# Patient Record
Sex: FEMALE | Race: WHITE | NOT HISPANIC OR LATINO | URBAN - METROPOLITAN AREA
[De-identification: names, ages, dates, MRNs, and addresses within clinical notes are randomized per-mention and may not be internally consistent; named-entity substitution may affect disease eponyms.]

---

## 2017-08-01 ENCOUNTER — TRANSCRIBE ORDERS (OUTPATIENT)
Dept: PHYSICAL THERAPY | Facility: HOSPITAL | Age: 42
End: 2017-08-01

## 2017-08-01 DIAGNOSIS — I89.0 LYMPHEDEMA: Primary | ICD-10-CM

## 2017-08-15 ENCOUNTER — HOSPITAL ENCOUNTER (OUTPATIENT)
Dept: OCCUPATIONAL THERAPY | Facility: HOSPITAL | Age: 42
Setting detail: THERAPIES SERIES
Discharge: HOME | End: 2017-08-15

## 2017-08-15 DIAGNOSIS — I89.0 LYMPHEDEMA OF BOTH LOWER EXTREMITIES: Primary | ICD-10-CM

## 2017-08-15 PROCEDURE — 97165 OT EVAL LOW COMPLEX 30 MIN: CPT

## 2017-08-15 NOTE — THERAPY EVALUATION
Outpatient Occupational Therapy Lymphedema Initial Evaluation  [facility]     Patient Name: Shelley Vo  : 1975  MRN: [id number]  Today's Date: 8/15/2017      Visit Date: 08/15/2017    There is no problem list on file for this patient.       No past medical history on file.     Past Surgical History:   Procedure Laterality Date   • ABDOMINAL SURGERY      due to ulcers   • GALLBLADDER SURGERY     • GASTRIC BYPASS           Visit Dx:     ICD-10-CM ICD-9-CM   1. Lymphedema of both lower extremities I89.0 457.1             Patient History       08/15/17 1600          History    Chief Complaint Other 1 (comment);Pain   bilateral LE edema  -RE      Date Current Problem(s) Began 08/15/17  -RE      Brief Description of Current Complaint Patient presents with bilateral LE edema and pain.  She reports it started about 10 years ago and has been ongoing.  She wore compression stockings during her last pregnancy but they did not work well for her.    -RE      Previous treatment for THIS PROBLEM Other (comment)   compression stockings  -RE      Are you or can you be pregnant No  -RE      Pain     Pain Location Leg  -RE      Pain at Present 5  -RE      Pain at Best 3  -RE      Pain at Worst 6  -RE      Pain Frequency Constant/continuous  -RE      Fall Risk Assessment    Any falls in the past year: Yes  -RE      Number of falls reported in the last 12 months 1  -RE      Factors that contributed to the fall: Other (comment)   faint due to vaso-vagal  -RE      Does patient have a fear of falling No  -RE      Services    Are you currently receiving Home Health services No  -RE      Daily Activities    Primary Language English  -RE      Are you able to read Yes  -RE      Are you able to write Yes  -RE      How does patient learn best? Reading  -RE      Teaching needs identified Home Exercise Program;Management of Condition  -RE      Patient is concerned about/has problems with Other (comment)   clothes fitting  -RE      Does  patient have problems with the following? Depression  -RE      Barriers to learning None  -RE      Functional Status dressing;mobility issues preventing performance of daily activities  -RE      Pt Participated in POC and Goals Yes  -RE      Safety    Are you being hurt, hit, or frightened by anyone at home or in your life? No  -RE      Are you being neglected by a caregiver No  -RE        User Key  (r) = Recorded By, (t) = Taken By, (c) = Cosigned By    Initials Name Provider Type    RE BARBARA Hernandez Occupational Therapist                Lymphedema       08/15/17 1600          Lymphedema Assessment    Lymphedema Classification RLE:;LLE:;secondary;stage 2 (Spontaneously Irreversible)  -RE      Chemo Received no  -RE      Radiation Therapy Received no  -RE      Infections or Cellulitis? no  -RE      Subjective Pain    Able to rate subjective pain? yes  -RE      Pre-Treatment Pain Level 6  -RE      Post-Treatment Pain Level 6  -RE      Lymphedema Edema Assessment    Ptting Edema Category By grade out of 4  -RE      Pitting Edema + 1/4 (+1 of 4);Moderate   edema is spongey and non pitting  -RE      Stemmer Sign bilateral:;negative  -RE      Lemoore Hump negative  -RE      Skin Changes/Observations    Location/Assessment Lower Extremity  -RE      Lower Extremity Conditions bilateral:;intact;clean;dry  -RE      Lower Extremity Color/Pigment bilateral:   normal color  -RE      Lymphedema Sensation    Lymphedema Sensation Reports RUE:;LUE:   normal  -RE      Lymphedema Pulses/Capillary Refill    Lymphedema Pulses/Capillary Refill capillary refill  -RE      Capillary Refill lower extremity capillary refill  -RE      Lower Extremity Capillary Refill right:;left:;less than 3 seconds  -RE      Lymphedema Measurements    Measurement Type(s) Circumferential  -RE      Circumferential Areas Lower extremities  -RE      LLE Circumferential (cm)    Measurement Location 1 Knee (popliteal crease)  -RE      Left 1 42 cm  -RE       Measurement Location 2 10 cm below knee  -RE      Left 2 50 cm  -RE      Measurement Location 3 20 cm below knee  -RE      Left 3 47 cm  -RE      Measurement Location 4 30 cm below knee  -RE      Left 4 36 cm  -RE      Measurement Location 5 Ankle  -RE      Left 5 25 cm  -RE      Measurement Location 6 Mid foot  -RE      Left 6 20.5 cm  -RE      Measurement Location 7 Total  -RE      Left 7 220.5 cm  -RE      RLE Circumferential (cm)    Right 1 42 cm  -RE      Right 2 51 cm  -RE      Right 3 48 cm  -RE      Right 4 35 cm  -RE      Right 5 25.5 cm  -RE      Right 6 20.4 cm  -RE      Right 7 221.9 cm  -RE        User Key  (r) = Recorded By, (t) = Taken By, (c) = Cosigned By    Initials Name Provider Type    BARBARA Leung Occupational Therapist                OT Ortho       08/15/17 1600          ROM (Range of Motion)    General ROM no range of motion deficits identified  -RE      MMT (Manual Muscle Testing)    General MMT Assessment Detail strength is >/= 4/5  -RE        User Key  (r) = Recorded By, (t) = Taken By, (c) = Cosigned By    Initials Name Provider Type    BARBARA Leung Occupational Therapist                    Therapy Education       08/15/17 1638          Therapy Education    Given Symptoms/condition management;Edema management  -RE      Program New  -RE      How Provided Verbal;Written  -RE      Provided to Patient  -RE      Level of Understanding Teach back education performed;Verbalized  -RE        User Key  (r) = Recorded By, (t) = Taken By, (c) = Cosigned By    Initials Name Provider Type    BARBARA Leung Occupational Therapist                        OT Goals       08/15/17 1600       OT Short Term Goals    STG Date to Achieve 08/29/17  -RE     STG 1 Patient independent and compliant with self-wrapping techniques of compression bandages with assistance of caregiver as needed for improved self-management of condition.  -RE     STG 1 Progress New  -RE     STG 2 Patient will  demonstrate proper awareness of condition and precautions for improved prevention, management, care of symptoms.  -RE     STG 2 Progress New  -RE     STG 3 Patient will demonstrate decreased net edema of bilateral lower extremities >/= 3-7cm  for decrease in edema, symptoms, decreased risk of infection and improved skin care/transition to self-care of condition.   -RE     STG 3 Progress New  -RE     Long Term Goals    LTG Date to Achieve 09/15/17  -RE     LTG 1 Patient will demonstrate decreased net edema of bilateral lower extremities >/= 8-15cm  for decrease in edema, symptoms, decreased risk of infection and improved skin care/transition to self-care of condition.   -RE     LTG 1 Progress New  -RE     LTG 2 Patient independent and compliant with home exercise program (as able) focused on range of motion, flexibility to improve lymphatic flow and LE discomfort.  -RE     LTG 2 Progress New  -RE     LTG 3 Patient independent and compliant with use and care of compression garments with assistance of a caregiver as needed to promote self-care independence.   -RE     LTG 3 Progress New  -RE     Time Calculation    OT Goal Re-Cert Due Date 09/15/17  -RE       User Key  (r) = Recorded By, (t) = Taken By, (c) = Cosigned By    Initials Name Provider Type    RE Ximena Barron OTR Occupational Therapist                OT Assessment/Plan       08/15/17 1617       OT Assessment    Functional Limitations Performance in self-care ADL;Performance in leisure activities;Limitation in home management  -RE     Impairments Edema;Pain  -RE     Assessment Comments Patient presents with bilateral LE lymphedema which is soft and non pitting. She reports that her legs are firm at times.  She could benfit from Complete Decongestive Therapy too decrease edema, prevent infection, protect skin integrity and to learn self care strategies.  -RE     Please refer to paper survey for additional self-reported information Yes  -RE     OT Diagnosis  bilateral LE lymphedema  -RE     OT Rehab Potential Good  -RE     Patient/caregiver participated in establishment of treatment plan and goals Yes  -RE     Patient would benefit from skilled therapy intervention Yes  -RE     OT Plan    OT Frequency 4x/week;3x/week  -RE     Predicted Duration of Therapy Intervention (days/wks) 4 weeks  -RE     Planned CPT's? OT EVAL LOW COMPLEXITY: 27814;OT SELF CARE/MGMT/TRAIN 15 MIN: 42561;OT THER PROC EA 15 MIN: [id number];OT THER ACT EA 15 MIN: [id number];OT MANUAL THERAPY EA 15 MIN: 91331  -RE     Planned Therapy Interventions (Optional Details) manual therapy techniques;home exercise program;patient/family education   Skin care and compression bandaging  -RE     OT Plan Comments Submit paperwork to [facility] to determine coverage for bandages and garments.  -RE       User Key  (r) = Recorded By, (t) = Taken By, (c) = Cosigned By    Initials Name Provider Type    RE BARBARA Hernandez Occupational Therapist                Time Calculation:   OT Start Time: 1430  OT Stop Time: 1530  OT Time Calculation (min): 60 min     Therapy Charges for Today     Code Description Service Date Service Provider Modifiers Qty    [id number]  OT EVAL LOW COMPLEXITY 4 8/15/2017 BARBARA Hernandez GO 1                    BARBARA Hernandez  8/15/2017

## 2017-12-19 ENCOUNTER — DOCUMENTATION (OUTPATIENT)
Dept: OCCUPATIONAL THERAPY | Facility: HOSPITAL | Age: 42
End: 2017-12-19

## 2017-12-19 NOTE — THERAPY DISCHARGE NOTE
Outpatient Occupational Therapy Lymphedema Treatment Note/Discharge Summary       Patient Name: Shelley Vo  : 1975  MRN: [id number]  Today's Date: 2017      Visit Date: 2017    There is no problem list on file for this patient.       No past medical history on file.     Past Surgical History:   Procedure Laterality Date   • ABDOMINAL SURGERY      due to ulcers   • GALLBLADDER SURGERY     • GASTRIC BYPASS           Visit Dx:    No diagnosis found.                                      OT Goals       17 1600       OT Short Term Goals    STG 1 Patient independent and compliant with self-wrapping techniques of compression bandages with assistance of caregiver as needed for improved self-management of condition.  -RE     STG 1 Progress Not Met  -RE     STG 2 Patient will demonstrate proper awareness of condition and precautions for improved prevention, management, care of symptoms.  -RE     STG 2 Progress Not Met  -RE     STG 3 Patient will demonstrate decreased net edema of bilateral lower extremities >/= 3-7cm  for decrease in edema, symptoms, decreased risk of infection and improved skin care/transition to self-care of condition.   -RE     STG 3 Progress Not Met  -RE     Long Term Goals    LTG 1 Patient will demonstrate decreased net edema of bilateral lower extremities >/= 8-15cm  for decrease in edema, symptoms, decreased risk of infection and improved skin care/transition to self-care of condition.   -RE     LTG 1 Progress Not Met  -RE     LTG 2 Patient independent and compliant with home exercise program (as able) focused on range of motion, flexibility to improve lymphatic flow and LE discomfort.  -RE     LTG 2 Progress Not Met  -RE     LTG 3 Patient independent and compliant with use and care of compression garments with assistance of a caregiver as needed to promote self-care independence.   -RE     LTG 3 Progress Not Met  -RE       User Key  (r) = Recorded By, (t) = Taken By, (c) =  Cosigned By    Initials Name Provider Type    RE Ximena Barron OTR Occupational Therapist                           Time Calculation:                       OP OT Discharge Summary  Date of Discharge: 12/19/17  Reason for Discharge: Unable to participate (Patient was unable to afford bandages.)  Outcomes Achieved: Unable to make functional progress toward goals at this time  Discharge Destination: Home without follow-up      BARBARA Hernandez  12/19/2017

## 2018-04-18 ENCOUNTER — OFFICE (AMBULATORY)
Dept: URBAN - METROPOLITAN AREA CLINIC 75 | Facility: CLINIC | Age: 43
End: 2018-04-18
Payer: COMMERCIAL

## 2018-04-18 VITALS
HEART RATE: 74 BPM | SYSTOLIC BLOOD PRESSURE: 114 MMHG | HEIGHT: 67 IN | DIASTOLIC BLOOD PRESSURE: 72 MMHG | WEIGHT: 202 LBS

## 2018-04-18 DIAGNOSIS — R10.32 LEFT LOWER QUADRANT PAIN: ICD-10-CM

## 2018-04-18 DIAGNOSIS — Z98.84 BARIATRIC SURGERY STATUS: ICD-10-CM

## 2018-04-18 DIAGNOSIS — R11.2 NAUSEA WITH VOMITING, UNSPECIFIED: ICD-10-CM

## 2018-04-18 DIAGNOSIS — K21.9 GASTRO-ESOPHAGEAL REFLUX DISEASE WITHOUT ESOPHAGITIS: ICD-10-CM

## 2018-04-18 DIAGNOSIS — D50.9 IRON DEFICIENCY ANEMIA, UNSPECIFIED: ICD-10-CM

## 2018-04-18 DIAGNOSIS — R10.31 RIGHT LOWER QUADRANT PAIN: ICD-10-CM

## 2018-04-18 DIAGNOSIS — K25.9 GASTRIC ULCER, UNSPECIFIED AS ACUTE OR CHRONIC, WITHOUT HEMO: ICD-10-CM

## 2018-04-18 DIAGNOSIS — R13.10 DYSPHAGIA, UNSPECIFIED: ICD-10-CM

## 2018-04-18 PROCEDURE — 99214 OFFICE O/P EST MOD 30 MIN: CPT

## 2018-04-18 RX ORDER — ONDANSETRON HYDROCHLORIDE 4 MG/1
16 TABLET, ORALLY DISINTEGRATING ORAL
Qty: 60 | Refills: 3 | Status: ACTIVE
Start: 2018-04-18

## 2018-04-18 RX ORDER — FAMOTIDINE 40 MG/1
TABLET, FILM COATED ORAL
Qty: 30 | Refills: 10 | Status: ACTIVE
Start: 2018-04-18

## 2018-05-24 VITALS
SYSTOLIC BLOOD PRESSURE: 79 MMHG | SYSTOLIC BLOOD PRESSURE: 82 MMHG | DIASTOLIC BLOOD PRESSURE: 54 MMHG | DIASTOLIC BLOOD PRESSURE: 71 MMHG | SYSTOLIC BLOOD PRESSURE: 108 MMHG | RESPIRATION RATE: 18 BRPM | HEART RATE: 62 BPM | HEIGHT: 67 IN | DIASTOLIC BLOOD PRESSURE: 44 MMHG | DIASTOLIC BLOOD PRESSURE: 39 MMHG | OXYGEN SATURATION: 98 % | HEART RATE: 72 BPM | OXYGEN SATURATION: 100 % | SYSTOLIC BLOOD PRESSURE: 137 MMHG | SYSTOLIC BLOOD PRESSURE: 102 MMHG | DIASTOLIC BLOOD PRESSURE: 80 MMHG | OXYGEN SATURATION: 97 % | OXYGEN SATURATION: 95 % | HEART RATE: 64 BPM | HEART RATE: 61 BPM | SYSTOLIC BLOOD PRESSURE: 93 MMHG | SYSTOLIC BLOOD PRESSURE: 88 MMHG | DIASTOLIC BLOOD PRESSURE: 78 MMHG | RESPIRATION RATE: 15 BRPM | DIASTOLIC BLOOD PRESSURE: 58 MMHG | HEART RATE: 66 BPM | HEART RATE: 71 BPM | DIASTOLIC BLOOD PRESSURE: 50 MMHG | HEART RATE: 60 BPM | RESPIRATION RATE: 16 BRPM | SYSTOLIC BLOOD PRESSURE: 125 MMHG | SYSTOLIC BLOOD PRESSURE: 126 MMHG | TEMPERATURE: 96.8 F | SYSTOLIC BLOOD PRESSURE: 85 MMHG | DIASTOLIC BLOOD PRESSURE: 46 MMHG | WEIGHT: 198 LBS | DIASTOLIC BLOOD PRESSURE: 45 MMHG | HEART RATE: 70 BPM | RESPIRATION RATE: 17 BRPM | RESPIRATION RATE: 20 BRPM | DIASTOLIC BLOOD PRESSURE: 53 MMHG | TEMPERATURE: 97.7 F

## 2018-05-29 ENCOUNTER — AMBULATORY SURGICAL CENTER (AMBULATORY)
Dept: URBAN - METROPOLITAN AREA SURGERY 17 | Facility: SURGERY | Age: 43
End: 2018-05-29
Payer: COMMERCIAL

## 2018-05-29 ENCOUNTER — OFFICE (AMBULATORY)
Dept: URBAN - METROPOLITAN AREA PATHOLOGY 4 | Facility: PATHOLOGY | Age: 43
End: 2018-05-29
Payer: COMMERCIAL

## 2018-05-29 DIAGNOSIS — R13.10 DYSPHAGIA, UNSPECIFIED: ICD-10-CM

## 2018-05-29 DIAGNOSIS — K21.9 GASTRO-ESOPHAGEAL REFLUX DISEASE WITHOUT ESOPHAGITIS: ICD-10-CM

## 2018-05-29 DIAGNOSIS — R10.11 RIGHT UPPER QUADRANT PAIN: ICD-10-CM

## 2018-05-29 DIAGNOSIS — D50.9 IRON DEFICIENCY ANEMIA, UNSPECIFIED: ICD-10-CM

## 2018-05-29 DIAGNOSIS — K64.1 SECOND DEGREE HEMORRHOIDS: ICD-10-CM

## 2018-05-29 DIAGNOSIS — Z48.815 ENCOUNTER FOR SURGICAL AFTERCARE FOLLOWING SURGERY ON THE DI: ICD-10-CM

## 2018-05-29 DIAGNOSIS — K57.30 DIVERTICULOSIS OF LARGE INTESTINE WITHOUT PERFORATION OR ABS: ICD-10-CM

## 2018-05-29 DIAGNOSIS — K22.2 ESOPHAGEAL OBSTRUCTION: ICD-10-CM

## 2018-05-29 LAB
GI HISTOLOGY: A. UNSPECIFIED: (no result)
GI HISTOLOGY: PDF REPORT: (no result)

## 2018-05-29 PROCEDURE — 43249 ESOPH EGD DILATION <30 MM: CPT | Performed by: INTERNAL MEDICINE

## 2018-05-29 PROCEDURE — 45378 DIAGNOSTIC COLONOSCOPY: CPT | Performed by: INTERNAL MEDICINE

## 2018-05-29 PROCEDURE — 88305 TISSUE EXAM BY PATHOLOGIST: CPT | Performed by: INTERNAL MEDICINE

## 2018-05-29 RX ADMIN — PROPOFOL 50 MG: 10 INJECTION, EMULSION INTRAVENOUS at 13:15

## 2018-05-29 RX ADMIN — PROPOFOL 25 MG: 10 INJECTION, EMULSION INTRAVENOUS at 13:28

## 2018-05-29 RX ADMIN — PROPOFOL 50 MG: 10 INJECTION, EMULSION INTRAVENOUS at 13:23

## 2018-05-29 RX ADMIN — PROPOFOL 50 MG: 10 INJECTION, EMULSION INTRAVENOUS at 13:17

## 2018-05-29 RX ADMIN — LIDOCAINE HYDROCHLORIDE 25 MG: 10 INJECTION, SOLUTION EPIDURAL; INFILTRATION; INTRACAUDAL; PERINEURAL at 13:08

## 2018-05-29 RX ADMIN — PROPOFOL 100 MG: 10 INJECTION, EMULSION INTRAVENOUS at 13:08

## 2018-05-29 NOTE — SERVICENOTES
Patient understands associated risk, benefit of endoscopy including bleeding, infection, missed polyp or missed cancer, perforation, missed lesion, death. Withdrawal time was > / = 6 minutes.
Patient may want to consider PPI in place of H2B given EGD findings.

## 2018-06-18 ENCOUNTER — OFFICE (AMBULATORY)
Dept: URBAN - METROPOLITAN AREA CLINIC 75 | Facility: CLINIC | Age: 43
End: 2018-06-18
Payer: COMMERCIAL

## 2018-06-18 VITALS
WEIGHT: 199 LBS | HEIGHT: 67 IN | HEART RATE: 88 BPM | DIASTOLIC BLOOD PRESSURE: 80 MMHG | SYSTOLIC BLOOD PRESSURE: 140 MMHG

## 2018-06-18 DIAGNOSIS — K21.9 GASTRO-ESOPHAGEAL REFLUX DISEASE WITHOUT ESOPHAGITIS: ICD-10-CM

## 2018-06-18 DIAGNOSIS — Z98.84 BARIATRIC SURGERY STATUS: ICD-10-CM

## 2018-06-18 DIAGNOSIS — R13.10 DYSPHAGIA, UNSPECIFIED: ICD-10-CM

## 2018-06-18 PROCEDURE — 99214 OFFICE O/P EST MOD 30 MIN: CPT

## 2018-06-18 RX ORDER — OMEPRAZOLE MAGNESIUM 40 MG/1
CAPSULE, DELAYED RELEASE ORAL
Qty: 180 | Refills: 3 | Status: ACTIVE
Start: 2018-06-18

## 2018-06-21 VITALS
DIASTOLIC BLOOD PRESSURE: 57 MMHG | HEART RATE: 60 BPM | SYSTOLIC BLOOD PRESSURE: 106 MMHG | DIASTOLIC BLOOD PRESSURE: 58 MMHG | HEART RATE: 65 BPM | HEART RATE: 61 BPM | WEIGHT: 197 LBS | RESPIRATION RATE: 18 BRPM | SYSTOLIC BLOOD PRESSURE: 103 MMHG | SYSTOLIC BLOOD PRESSURE: 135 MMHG | SYSTOLIC BLOOD PRESSURE: 111 MMHG | OXYGEN SATURATION: 96 % | DIASTOLIC BLOOD PRESSURE: 100 MMHG | HEART RATE: 68 BPM | OXYGEN SATURATION: 100 % | TEMPERATURE: 96.6 F | RESPIRATION RATE: 20 BRPM | HEIGHT: 67 IN | RESPIRATION RATE: 16 BRPM | DIASTOLIC BLOOD PRESSURE: 60 MMHG | RESPIRATION RATE: 21 BRPM | TEMPERATURE: 97.2 F | SYSTOLIC BLOOD PRESSURE: 121 MMHG | DIASTOLIC BLOOD PRESSURE: 70 MMHG | HEART RATE: 64 BPM | HEART RATE: 63 BPM

## 2018-06-25 ENCOUNTER — AMBULATORY SURGICAL CENTER (AMBULATORY)
Dept: URBAN - METROPOLITAN AREA SURGERY 17 | Facility: SURGERY | Age: 43
End: 2018-06-25
Payer: COMMERCIAL

## 2018-06-25 DIAGNOSIS — Z48.815 ENCOUNTER FOR SURGICAL AFTERCARE FOLLOWING SURGERY ON THE DI: ICD-10-CM

## 2018-06-25 DIAGNOSIS — K21.9 GASTRO-ESOPHAGEAL REFLUX DISEASE WITHOUT ESOPHAGITIS: ICD-10-CM

## 2018-06-25 DIAGNOSIS — R13.10 DYSPHAGIA, UNSPECIFIED: ICD-10-CM

## 2018-06-25 DIAGNOSIS — K22.2 ESOPHAGEAL OBSTRUCTION: ICD-10-CM

## 2018-06-25 PROCEDURE — 43235 EGD DIAGNOSTIC BRUSH WASH: CPT | Performed by: INTERNAL MEDICINE

## 2018-06-25 RX ADMIN — PROPOFOL 50 MG: 10 INJECTION, EMULSION INTRAVENOUS at 15:35

## 2018-06-25 RX ADMIN — PROPOFOL 50 MG: 10 INJECTION, EMULSION INTRAVENOUS at 15:38

## 2018-06-25 RX ADMIN — PROPOFOL 50 MG: 10 INJECTION, EMULSION INTRAVENOUS at 15:33

## 2018-06-25 RX ADMIN — PROPOFOL 50 MG: 10 INJECTION, EMULSION INTRAVENOUS at 15:36

## 2018-06-25 RX ADMIN — PROPOFOL 50 MG: 10 INJECTION, EMULSION INTRAVENOUS at 15:34

## 2018-06-25 RX ADMIN — PROPOFOL 100 MG: 10 INJECTION, EMULSION INTRAVENOUS at 15:32

## 2018-06-25 RX ADMIN — LIDOCAINE HYDROCHLORIDE 25 MG: 10 INJECTION, SOLUTION EPIDURAL; INFILTRATION; INTRACAUDAL; PERINEURAL at 15:32

## 2018-06-25 NOTE — SERVICENOTES
Patient with "distal" dysphagia complaints,,,,started 3 weeks after last EGD/dilation.
Also with proximal Eo "pain with swallowing" but no dysphagia proximally.
Given today's findings,,,,,will proceed with Esophagram to evaluate dysphagia complaints further,,,,? distal Eo motility problem 2/2 prior surgery ???
No need for dilation of distal Eo today given widely patent ring......

## 2018-08-06 ENCOUNTER — OFFICE (AMBULATORY)
Dept: URBAN - METROPOLITAN AREA CLINIC 75 | Facility: CLINIC | Age: 43
End: 2018-08-06
Payer: COMMERCIAL

## 2018-08-06 VITALS
DIASTOLIC BLOOD PRESSURE: 78 MMHG | SYSTOLIC BLOOD PRESSURE: 122 MMHG | WEIGHT: 203 LBS | HEART RATE: 80 BPM | RESPIRATION RATE: 16 BRPM | HEIGHT: 67 IN

## 2018-08-06 DIAGNOSIS — R13.10 DYSPHAGIA, UNSPECIFIED: ICD-10-CM

## 2018-08-06 DIAGNOSIS — F45.8 OTHER SOMATOFORM DISORDERS: ICD-10-CM

## 2018-08-06 PROCEDURE — 99213 OFFICE O/P EST LOW 20 MIN: CPT | Performed by: INTERNAL MEDICINE

## 2018-08-06 RX ORDER — OMEPRAZOLE MAGNESIUM 40 MG/1
CAPSULE, DELAYED RELEASE ORAL
Qty: 180 | Refills: 3 | Status: ACTIVE
Start: 2018-06-18

## 2018-11-12 ENCOUNTER — OFFICE (AMBULATORY)
Dept: URBAN - METROPOLITAN AREA CLINIC 75 | Facility: CLINIC | Age: 43
End: 2018-11-12
Payer: COMMERCIAL

## 2018-11-12 VITALS
DIASTOLIC BLOOD PRESSURE: 86 MMHG | RESPIRATION RATE: 16 BRPM | WEIGHT: 186 LBS | HEIGHT: 67 IN | SYSTOLIC BLOOD PRESSURE: 130 MMHG | HEART RATE: 88 BPM

## 2018-11-12 DIAGNOSIS — K25.9 GASTRIC ULCER, UNSPECIFIED AS ACUTE OR CHRONIC, WITHOUT HEMO: ICD-10-CM

## 2018-11-12 DIAGNOSIS — Z83.71 FAMILY HISTORY OF COLONIC POLYPS: ICD-10-CM

## 2018-11-12 DIAGNOSIS — R10.13 EPIGASTRIC PAIN: ICD-10-CM

## 2018-11-12 PROCEDURE — 99214 OFFICE O/P EST MOD 30 MIN: CPT | Performed by: INTERNAL MEDICINE

## 2018-11-13 ENCOUNTER — AMBULATORY SURGICAL CENTER (AMBULATORY)
Dept: URBAN - METROPOLITAN AREA SURGERY 17 | Facility: SURGERY | Age: 43
End: 2018-11-13
Payer: COMMERCIAL

## 2018-11-13 VITALS
SYSTOLIC BLOOD PRESSURE: 124 MMHG | RESPIRATION RATE: 12 BRPM | TEMPERATURE: 97.6 F | SYSTOLIC BLOOD PRESSURE: 116 MMHG | OXYGEN SATURATION: 100 % | DIASTOLIC BLOOD PRESSURE: 57 MMHG | HEART RATE: 74 BPM | SYSTOLIC BLOOD PRESSURE: 114 MMHG | DIASTOLIC BLOOD PRESSURE: 59 MMHG | TEMPERATURE: 98.2 F | SYSTOLIC BLOOD PRESSURE: 130 MMHG | RESPIRATION RATE: 6 BRPM | SYSTOLIC BLOOD PRESSURE: 132 MMHG | RESPIRATION RATE: 18 BRPM | HEART RATE: 64 BPM | DIASTOLIC BLOOD PRESSURE: 72 MMHG | HEART RATE: 77 BPM | HEART RATE: 63 BPM | OXYGEN SATURATION: 98 % | RESPIRATION RATE: 8 BRPM | RESPIRATION RATE: 20 BRPM | SYSTOLIC BLOOD PRESSURE: 103 MMHG | DIASTOLIC BLOOD PRESSURE: 62 MMHG | OXYGEN SATURATION: 96 % | WEIGHT: 185 LBS | HEART RATE: 67 BPM | HEART RATE: 69 BPM | DIASTOLIC BLOOD PRESSURE: 48 MMHG | HEIGHT: 67 IN

## 2018-11-13 DIAGNOSIS — Z48.815 ENCOUNTER FOR SURGICAL AFTERCARE FOLLOWING SURGERY ON THE DI: ICD-10-CM

## 2018-11-13 DIAGNOSIS — R10.13 EPIGASTRIC PAIN: ICD-10-CM

## 2018-11-13 DIAGNOSIS — K25.9 GASTRIC ULCER, UNSPECIFIED AS ACUTE OR CHRONIC, WITHOUT HEMO: ICD-10-CM

## 2018-11-13 PROCEDURE — 43235 EGD DIAGNOSTIC BRUSH WASH: CPT | Performed by: INTERNAL MEDICINE

## 2018-11-13 NOTE — SERVICEHPINOTES
November 12, 2018. Patient here for follow, she showed up late for her appointment. She has a history of multiple medical issues. Prior gastric ulceration, gastric bypass, iron deficiency, dysphagia, migratory abdominal pain. She underwent upper endoscopy in June, distal esophageal dilation was performed. Dysphagia improved but then recurred shortly thereafter. Her anemia is felt to be due to malabsorption given her history of prior gastric bypass. MRCP in June ( done 2/2 RUQ pain ) showed mild dilation of the common bile duct, expected post cholecystectomy. Given her ongoing dysphagia despite dilation, barium swallow was performed, this was entirely normal. Repeat endoscopy in June 2018 again showed a small Schatzki's ring but widely patent esophagus. Normal-appearing postoperative gastric anatomy.BRAt her last visit she was complaining of pain in the cervical area with swallowing. Globus. She was to be evlauated by ENT given history of parathyroid disorder.BRWhen I saw her in August, her dysphagia actually had improved. She was not having any problematic reflux. She was advised to continue high-dose PPI therapy and follow up with ENT regarding globus concerns.Today, patient's complaint is recurrent her quadrant pain after eating. Pain radiates to her right back. She has had previous gallbladder surgery. She went to the emergency room for these complaints. CAT scan was performed, the showed no evidence for bowel obstruction. Normal postoperative gastric bypass, cholecystectomy anatomy. patient states that she has been having some sensation of food sticking in her stomach but no dysphagia relating to her esophagus. Over the past 10 days she's had right upper quadrant, epigastric pain, she feels like her stomach is "on fire". Severe. Worse with eating. Better without food. No melena. This prompted the above-mentioned emergency room visit. She was given Zofran, Carafate, metoclopramide which seems to be helping. She denies any aspirin, NSAIDs.

## 2018-11-13 NOTE — SERVICENOTES
? etiology of pain, no signs of ulceration....
If not improving with current medical tx,,,,she needs opinion from her bariatric surgeon.

## 2021-04-01 ENCOUNTER — LAB REQUISITION (OUTPATIENT)
Dept: LAB | Facility: HOSPITAL | Age: 46
End: 2021-04-01

## 2021-04-01 DIAGNOSIS — R13.10 DYSPHAGIA, UNSPECIFIED: ICD-10-CM

## 2021-04-01 DIAGNOSIS — R10.9 UNSPECIFIED ABDOMINAL PAIN: ICD-10-CM

## 2021-04-01 PROCEDURE — 88305 TISSUE EXAM BY PATHOLOGIST: CPT | Performed by: SURGERY

## 2021-04-02 LAB
LAB AP CASE REPORT: NORMAL
PATH REPORT.FINAL DX SPEC: NORMAL
PATH REPORT.GROSS SPEC: NORMAL

## 2023-04-27 ENCOUNTER — OFFICE VISIT (OUTPATIENT)
Dept: FAMILY MEDICINE CLINIC | Facility: CLINIC | Age: 48
End: 2023-04-27
Payer: COMMERCIAL

## 2023-04-27 VITALS
OXYGEN SATURATION: 98 % | HEART RATE: 86 BPM | SYSTOLIC BLOOD PRESSURE: 136 MMHG | BODY MASS INDEX: 30.61 KG/M2 | WEIGHT: 195 LBS | DIASTOLIC BLOOD PRESSURE: 80 MMHG | HEIGHT: 67 IN

## 2023-04-27 DIAGNOSIS — F32.A MODERATE DEPRESSIVE DISORDER: ICD-10-CM

## 2023-04-27 DIAGNOSIS — K86.1 CHRONIC BILIARY PANCREATITIS: ICD-10-CM

## 2023-04-27 DIAGNOSIS — B37.2 INTERTRIGINOUS CANDIDIASIS: ICD-10-CM

## 2023-04-27 DIAGNOSIS — E66.09 CLASS 1 OBESITY DUE TO EXCESS CALORIES WITH SERIOUS COMORBIDITY AND BODY MASS INDEX (BMI) OF 30.0 TO 30.9 IN ADULT: ICD-10-CM

## 2023-04-27 DIAGNOSIS — M12.9 ARTHRITIS, MULTIPLE JOINT INVOLVEMENT: ICD-10-CM

## 2023-04-27 DIAGNOSIS — I89.0 LYMPHEDEMA OF BOTH LOWER EXTREMITIES: Primary | ICD-10-CM

## 2023-04-27 DIAGNOSIS — F41.1 GAD (GENERALIZED ANXIETY DISORDER): ICD-10-CM

## 2023-04-27 DIAGNOSIS — G47.00 PERSISTENT INSOMNIA: ICD-10-CM

## 2023-04-27 DIAGNOSIS — J30.2 SEASONAL ALLERGIES: ICD-10-CM

## 2023-04-27 PROBLEM — Q44.7: Status: ACTIVE | Noted: 2023-04-27

## 2023-04-27 PROBLEM — Q44.1: Status: ACTIVE | Noted: 2023-04-27

## 2023-04-27 PROBLEM — E66.9 CLASS 1 OBESITY WITH SERIOUS COMORBIDITY AND BODY MASS INDEX (BMI) OF 30.0 TO 30.9 IN ADULT: Status: ACTIVE | Noted: 2023-04-27

## 2023-04-27 PROBLEM — Q44.5: Status: ACTIVE | Noted: 2023-04-27

## 2023-04-27 PROBLEM — Q44.70: Status: ACTIVE | Noted: 2023-04-27

## 2023-04-27 PROBLEM — Z98.84 STATUS POST GASTRIC BYPASS FOR OBESITY: Status: ACTIVE | Noted: 2023-04-27

## 2023-04-27 PROBLEM — E66.811 CLASS 1 OBESITY WITH SERIOUS COMORBIDITY AND BODY MASS INDEX (BMI) OF 30.0 TO 30.9 IN ADULT: Status: ACTIVE | Noted: 2023-04-27

## 2023-04-27 RX ORDER — BUSPIRONE HYDROCHLORIDE 30 MG/1
1 TABLET ORAL 3 TIMES DAILY
COMMUNITY
Start: 2023-03-17

## 2023-04-27 RX ORDER — FLUTICASONE PROPIONATE 50 MCG
2 SPRAY, SUSPENSION (ML) NASAL DAILY
Qty: 11.1 ML | Refills: 10 | Status: SHIPPED | OUTPATIENT
Start: 2023-04-27

## 2023-04-27 RX ORDER — CLOTRIMAZOLE AND BETAMETHASONE DIPROPIONATE 10; .64 MG/G; MG/G
1 CREAM TOPICAL 2 TIMES DAILY
Qty: 30 G | Refills: 5 | Status: SHIPPED | OUTPATIENT
Start: 2023-04-27

## 2023-04-27 RX ORDER — OXYCODONE AND ACETAMINOPHEN 10; 325 MG/1; MG/1
1 TABLET ORAL 4 TIMES DAILY
COMMUNITY
Start: 2023-04-04

## 2023-04-27 RX ORDER — HYDROCHLOROTHIAZIDE 50 MG/1
50 TABLET ORAL DAILY
COMMUNITY

## 2023-04-27 RX ORDER — CETIRIZINE HYDROCHLORIDE 10 MG/1
10 TABLET ORAL DAILY
Qty: 90 TABLET | Refills: 3 | Status: SHIPPED | OUTPATIENT
Start: 2023-04-27

## 2023-04-27 RX ORDER — FLUTICASONE PROPIONATE 50 MCG
2 SPRAY, SUSPENSION (ML) NASAL DAILY
COMMUNITY
Start: 2023-04-24 | End: 2023-04-27 | Stop reason: SDUPTHER

## 2023-04-27 RX ORDER — TRAZODONE HYDROCHLORIDE 150 MG/1
150 TABLET ORAL
COMMUNITY
Start: 2023-03-15

## 2023-04-27 RX ORDER — DULOXETIN HYDROCHLORIDE 60 MG/1
1 CAPSULE, DELAYED RELEASE ORAL DAILY
COMMUNITY
Start: 2023-03-15

## 2023-04-27 RX ORDER — FLUCONAZOLE 150 MG/1
TABLET ORAL
Qty: 5 TABLET | Refills: 1 | Status: SHIPPED | OUTPATIENT
Start: 2023-04-27

## 2023-04-27 RX ORDER — AMITRIPTYLINE HYDROCHLORIDE 100 MG/1
100 TABLET, FILM COATED ORAL
COMMUNITY
Start: 2023-03-15

## 2023-04-27 RX ORDER — PANTOPRAZOLE SODIUM 40 MG/1
1 TABLET, DELAYED RELEASE ORAL DAILY
COMMUNITY
Start: 2023-03-03

## 2023-04-27 RX ORDER — CETIRIZINE HYDROCHLORIDE 10 MG/1
1 TABLET ORAL DAILY
COMMUNITY
Start: 2023-03-29 | End: 2023-04-27 | Stop reason: SDUPTHER

## 2023-04-27 NOTE — PROGRESS NOTES
"Chief Complaint  Establish Care (/)    Subjective        Shelley Vo presents to [facility]  History of Present Illness  Patient reports today to establish care.  Patient reports moving to [place] from [place] 2 months ago.  Patient reports before she left she had a physical with her primary care.  Patient reports she is up-to-date with health maintenance however no records available this date.    Patient reports having lymphedema since 2017.  Patient states she is currently followed by lymphedema clinic in [place].    Patient reports having chronic pain secondary to lymphedema and arthritis states her pain is well controlled by pain clinic in [place] and she will continue her care there.  Patient reports she is also followed by rheumatology secondary to joint pain and she is being evaluated for possible autoimmune disorder.    Patient reports having depression, insomnia and anxiety states she takes medication prescribed by psychiatry and she would like to continue follow-up with them.    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  Patient states she is followed by gastroenterology and will continue follow-up.    Patient reports having gastric bypass and states she has a large amount of loose skin.  Patient states she gets yeast infections underneath her skin flaps.  Patient reports she has been having a flareup of yeast for about 3 months.  Patient would like medication to help.    Patient reports having seasonal allergies and requests a refill of her medications states is working well.      Objective   Vital Signs:  /80 (BP Location: Left arm, Patient Position: Sitting, Cuff Size: Large Adult)   Pulse 86   Ht 170.2 cm (67\")   Wt 88.5 kg (195 lb)   SpO2 98%   BMI 30.54 kg/m²   Estimated body mass index is 30.54 kg/m² as calculated from the following:    Height as of this encounter: 170.2 cm (67\").    Weight as of this " encounter: 88.5 kg (195 lb).             Physical Exam  Vitals and nursing note reviewed.   Constitutional:       General: She is not in acute distress.     Appearance: Normal appearance.   HENT:      Head: Normocephalic.      Right Ear: Hearing normal.      Left Ear: Hearing normal.   Eyes:      Pupils: Pupils are equal, round, and reactive to light.   Cardiovascular:      Rate and Rhythm: Normal rate and regular rhythm.   Pulmonary:      Effort: Pulmonary effort is normal. No respiratory distress.   Musculoskeletal:      Right lower leg: Edema present.      Left lower leg: Edema present.   Skin:     General: Skin is warm and dry.   Neurological:      Mental Status: She is alert.   Psychiatric:         Mood and Affect: Mood normal.        Result Review :                   Assessment and Plan   Diagnoses and all orders for this visit:    1. Lymphedema of both lower extremities (Primary)  Assessment & Plan:  Patient will continue current treatment plan with lymphedema clinic.    Orders:  -     clotrimazole-betamethasone (LOTRISONE) 1-0.05 % cream; Apply 1 application topically to the appropriate area as directed 2 (Two) Times a Day.  Dispense: 30 g; Refill: 5    2. Arthritis, multiple joint involvement  Assessment & Plan:  Patient will continue follow-up with rheumatology and pain management clinic.      3. Moderate depressive disorder  Assessment & Plan:  Patient will continue current treatment plan with psychiatry.      4. CARMEN (generalized anxiety disorder)  Assessment & Plan:  Patient will continue current treatment plan with psychiatry      5. Seasonal allergies  Assessment & Plan:  Refill patient's medication    Orders:  -     cetirizine (zyrTEC) 10 MG tablet; Take 1 tablet by mouth Daily. For allergies  Dispense: 90 tablet; Refill: 3  -     fluticasone (FLONASE) 50 MCG/ACT nasal spray; 2 sprays by Each Nare route Daily. For allergies  Dispense: 11.1 mL; Refill: 10    6. Persistent insomnia  Assessment &  Plan:  Patient will continue current treatment plan with psychiatry      7. Intertriginous candidiasis  Assessment & Plan:  Patient provided with Lotrisone cream and Diflucan.  Discussed keeping the areas clean and dry call if any problems or    Orders:  -     fluconazole (Diflucan) 150 MG tablet; Take 1 tab po every other day until finished  Dispense: 5 tablet; Refill: 1    8. Chronic biliary pancreatitis  Assessment & Plan:  Patient will continue follow-up with gastro enterology      9. Class 1 obesity due to excess calories with serious comorbidity and body mass index (BMI) of 30.0 to 30.9 in adult  Assessment & Plan:  Patient reports she continues to meet with a nutritionist from her bariatric office.  We will continue current treatment plan.             Follow Up   Return in about 6 months (around 10/27/2023).  Patient was given instructions and counseling regarding her condition or for health maintenance advice. Please see specific information pulled into the AVS if appropriate.

## 2023-04-27 NOTE — ASSESSMENT & PLAN NOTE
Patient provided with Lotrisone cream and Diflucan.  Discussed keeping the areas clean and dry call if any problems or

## 2023-04-27 NOTE — ASSESSMENT & PLAN NOTE
Patient reports she continues to meet with a nutritionist from her bariatric office.  We will continue current treatment plan.

## 2023-05-30 ENCOUNTER — OFFICE VISIT (OUTPATIENT)
Dept: FAMILY MEDICINE CLINIC | Facility: CLINIC | Age: 48
End: 2023-05-30

## 2023-05-30 VITALS
BODY MASS INDEX: 25.54 KG/M2 | HEIGHT: 71 IN | OXYGEN SATURATION: 99 % | SYSTOLIC BLOOD PRESSURE: 122 MMHG | HEART RATE: 91 BPM | DIASTOLIC BLOOD PRESSURE: 78 MMHG | TEMPERATURE: 98 F | WEIGHT: 182.4 LBS

## 2023-05-30 DIAGNOSIS — F32.A MODERATE DEPRESSIVE DISORDER: ICD-10-CM

## 2023-05-30 DIAGNOSIS — I89.0 LYMPHEDEMA OF BOTH LOWER EXTREMITIES: Primary | ICD-10-CM

## 2023-05-30 DIAGNOSIS — F41.1 GAD (GENERALIZED ANXIETY DISORDER): ICD-10-CM

## 2023-05-30 DIAGNOSIS — G47.00 PERSISTENT INSOMNIA: ICD-10-CM

## 2023-05-30 DIAGNOSIS — K59.03 DRUG-INDUCED CONSTIPATION: ICD-10-CM

## 2023-05-30 NOTE — LETTER
May 30, 2023    Shelley Vo  [address] [place], [place] [id number]      To Whom It May Concern:    Ms. Vo has been a patient here at [facility] since April 2023. I can confirm that she has a diagnosis of lymphedema (Dx: I89.0). Please contact us for further questions.       Sincerely,               Ella Barrientos PA-C

## 2023-05-30 NOTE — PROGRESS NOTES
j    Office Note     Name: Shelley Vo    : 1975     MRN: [id number]     Chief Complaint  Depression (Patient wants to discuss changing his meds to us) and Constipation    Subjective     History of Present Illness:  Shelley Vo is a 47 y.o. female who presents today for eval lymphedema, constipation, and depression.  She states that she has chronic lymphedema, which is related to her multiple surgeries that she has had in her abdomen, and she wears compression sleeves on her legs and her arms most of the time.  She is not wearing them today due to this evaluation.  She states that she also goes to physical therapy here in [facility] for treatment of the lymphedema, and she goes to aquatic therapy and [facility].  She states that she is in need of a new pneumatic pump, and she needs a letter from her primary care stating that she has lymphedema.    She states that she has had gastric bypass and chronic pancreatitis.  She states that she had stents placed, removed, replaced etc.  She states that she has also had a new surgery with a bypass.  She states that she has done well with it so far.  She is followed by providers and [facility] for that.  She states that she has had to be on long term opioid therapy because of her chronic pancreatitis and she is currently having problems with constipation.  She states that she takes MiraLAX, but it only works if she takes it twice a day.  She states that she eats mostly fruits and vegetables, and she drinks protein shakes.  She states that she also drinks 80 ounces of water each day, but she still has constipation.    She requests a refill on her HCTZ 50mg BID.  She states that she has been taking dose for a very long time for her swelling and she has not had problems with her electrolytes or her kidneys.    She states that she also sees a psychiatrist and [facility], but it is difficult for her to get there for so many trips.  She states that she just  recently moved here from [place] and does not have a car.  She will need medical transportation for any appointments out of town, so she would like to switch her psychiatric medications to us if possible.  She states that she has been doing very well on her current medications and she has been on the same meds at the same doses for several years.    Past Medical History: History reviewed. No pertinent past medical history.    Past Surgical History:   Past Surgical History:   Procedure Laterality Date   • ABDOMINAL SURGERY      due to ulcers   •  SECTION     • COLON RESECTION     • GALLBLADDER SURGERY     • GASTRIC BYPASS         Family History: History reviewed. No pertinent family history.    Social History:   Social History     Socioeconomic History   • Marital status:    Tobacco Use   • Smoking status: Never   • Smokeless tobacco: Never   Vaping Use   • Vaping Use: Never used   Substance and Sexual Activity   • Alcohol use: Not Currently   • Drug use: Never   • Sexual activity: Yes     Partners: Male     Birth control/protection: Tubal ligation       Immunizations:   Immunization History   Administered Date(s) Administered   • FluLaval/Fluzone >6mos 12/15/2015, 2016, 10/31/2017, 10/15/2019   • Influenza Seasonal Injectable 2016   • Influenza, Unspecified 2018, 10/30/2020   • Tdap 2016, 2017        Medications:     Current Outpatient Medications:   •  amitriptyline (ELAVIL) 100 MG tablet, Take 1 tablet by mouth every night at bedtime., Disp: , Rfl:   •  busPIRone (BUSPAR) 30 MG tablet, Take 1 tablet by mouth 3 (Three) Times a Day., Disp: , Rfl:   •  cetirizine (zyrTEC) 10 MG tablet, Take 1 tablet by mouth Daily. For allergies, Disp: 90 tablet, Rfl: 3  •  clotrimazole-betamethasone (LOTRISONE) 1-0.05 % cream, Apply 1 application topically to the appropriate area as directed 2 (Two) Times a Day., Disp: 30 g, Rfl: 5  •  DULoxetine (CYMBALTA) 60 MG capsule, Take 1  "capsule by mouth Daily., Disp: , Rfl:   •  fluticasone (FLONASE) 50 MCG/ACT nasal spray, 2 sprays by Each Nare route Daily. For allergies, Disp: 11.1 mL, Rfl: 10  •  hydroCHLOROthiazide (HYDRODIURIL) 50 MG tablet, Take 1 tablet by mouth Daily., Disp: , Rfl:   •  oxyCODONE-acetaminophen (PERCOCET)  MG per tablet, Take 1 tablet by mouth 4 (Four) Times a Day., Disp: , Rfl:   •  pantoprazole (PROTONIX) 40 MG EC tablet, Take 1 tablet by mouth Daily., Disp: , Rfl:   •  traZODone (DESYREL) 150 MG tablet, Take 1 tablet by mouth every night at bedtime., Disp: , Rfl:   •  VITAMIN C, CALCIUM ASCORBATE, PO, Take  by mouth., Disp: , Rfl:   •  docusate sodium (COLACE) 50 MG capsule, Take 1 capsule by mouth 2 (Two) Times a Day., Disp: 60 capsule, Rfl: 1  •  fluconazole (Diflucan) 150 MG tablet, Take 1 tab po every other day until finished (Patient not taking: Reported on 5/30/2023), Disp: 5 tablet, Rfl: 1    Allergies:   Allergies   Allergen Reactions   • Levofloxacin Unknown - Low Severity     Other reaction(s): joints hurt post taking   • Nsaids Other (See Comments)     Hx ulcers  Other reaction(s): Other (See Comments)  Pt states upset stomach  Hx ulcers  Pt states upset stomach    contraindicated due to stomach ulcersPt states upset stomach  contraindicated due to stomach ulcersPt states upset stomach     • Adhesive Tape Rash     Surgical tape - bruising and rash  Surgical tape - bruising and rash         Objective     Vital Signs  /78   Pulse 91   Temp 98 °F (36.7 °C) (Temporal)   Ht 180.3 cm (71\")   Wt 82.7 kg (182 lb 6.4 oz)   SpO2 99%   BMI 25.44 kg/m²   Estimated body mass index is 25.44 kg/m² as calculated from the following:    Height as of this encounter: 180.3 cm (71\").    Weight as of this encounter: 82.7 kg (182 lb 6.4 oz).    Physical Exam  Vitals and nursing note reviewed.   Constitutional:       General: She is not in acute distress.     Appearance: Normal appearance. She is normal weight. She " is not ill-appearing.   HENT:      Head: Normocephalic and atraumatic.      Mouth/Throat:      Mouth: Mucous membranes are moist.      Pharynx: Oropharynx is clear.   Eyes:      Extraocular Movements: Extraocular movements intact.      Conjunctiva/sclera: Conjunctivae normal.      Pupils: Pupils are equal, round, and reactive to light.   Cardiovascular:      Rate and Rhythm: Normal rate and regular rhythm.      Pulses: Normal pulses.      Heart sounds: Normal heart sounds.   Pulmonary:      Effort: Pulmonary effort is normal. No respiratory distress.      Breath sounds: Normal breath sounds.   Lymphadenopathy:      Comments: Lymphedema of bilateral lower extremities   Skin:     General: Skin is warm and dry.   Neurological:      General: No focal deficit present.      Mental Status: She is alert and oriented to person, place, and time.      Cranial Nerves: No cranial nerve deficit.   Psychiatric:         Mood and Affect: Mood normal.         Behavior: Behavior normal.         Thought Content: Thought content normal.         Judgment: Judgment normal.       Results:  Reviewed results of labs from 4/18/2023.    Assessment and Plan     Assessment/Plan:  Diagnoses and all orders for this visit:    1. Lymphedema of both lower extremities (Primary)  Assessment & Plan:  I recommend that she continue current therapies and compression stockings for her lymphedema.      2. Drug-induced constipation  Assessment & Plan:  I recommend that she continue to drink plenty of fluids and fiber.  I will add a stool softener to see if symptoms improve.    Orders:  -     docusate sodium (COLACE) 50 MG capsule; Take 1 capsule by mouth 2 (Two) Times a Day.  Dispense: 60 capsule; Refill: 1    3. Moderate depressive disorder  Assessment & Plan:  Patient's depression is recurrent and is stable without psychosis. Their depression is currently active and the condition is improving with treatment. This will be reassessed at the next regular  appointment. F/U as described:patient will continue current medication therapy.      4. CARMEN (generalized anxiety disorder)  Assessment & Plan:  I advised her that I cannot the BuSpar at current dose because it is over the maximum recommended dose, and she is also combining it with several other medications.  If she would like to continue all of her medications at the current dose, then I would recommend that she continue with her psychiatrist or allow us to refer her to one closer.      5. Persistent insomnia  Assessment & Plan:  Medication as prescribed by psychiatry.    Approximately 32 minutes spent reviewing previous notes, reviewing previous labs, interviewing the patient, examining the patient, discussing medication options, and documenting.    Follow Up  Return in about 6 weeks (around 7/11/2023) for Recheck.    Ella Barrientos PA-C  [facility]

## 2023-05-31 NOTE — ASSESSMENT & PLAN NOTE
Patient's depression is recurrent and is stable without psychosis. Their depression is currently active and the condition is improving with treatment. This will be reassessed at the next regular appointment. F/U as described:patient will continue current medication therapy.

## 2023-05-31 NOTE — ASSESSMENT & PLAN NOTE
I advised her that I cannot the BuSpar at current dose because it is over the maximum recommended dose, and she is also combining it with several other medications.  If she would like to continue all of her medications at the current dose, then I would recommend that she continue with her psychiatrist or allow us to refer her to one closer.

## 2023-05-31 NOTE — ASSESSMENT & PLAN NOTE
I recommend that she continue to drink plenty of fluids and fiber.  I will add a stool softener to see if symptoms improve.

## 2023-07-21 ENCOUNTER — TELEPHONE (OUTPATIENT)
Dept: FAMILY MEDICINE CLINIC | Facility: CLINIC | Age: 48
End: 2023-07-21

## 2023-07-21 NOTE — TELEPHONE ENCOUNTER
Caller: Shelley Vo    Relationship: Self    Best call back number: [phone]     What was the call regarding:  PATIENT STATED THAT A PAPER WAS BEING FAXED TO THE OFFICE TODAY 07/21/2023 BY HER INSURANCE FOR HER TO GET TRANSPORTATION AND THE FORM NEED TO BE FILLED OUT AND FAXED BACK BY MONDAY 07/24/2023. PATIENT WOULD LIKE A CALL BACK TO BE INFORMED THE OFFICE RECEIVED THE FORM AND WOULD LIKE TO BE INFORMED WHEN THE FORM HAS BEEN FILLED OUT AND COMPLETED BY SON[facility]ORRE[facility]

## 2023-07-25 NOTE — TELEPHONE ENCOUNTER
Caller: Shelley Vo     Relationship: Self     Best call back number: [phone]      What was the call regarding: CALLED BACK 7.25 AND SAYS IT STILL HAS NOT BEEN DONE. NEEDED THIS TO BE FILLED OUT ASAP SO SHE CAN GET TRANSPORTATION.       PATIENT STATED THAT A PAPER WAS BEING FAXED TO THE OFFICE TODAY 07/21/2023 BY HER INSURANCE FOR HER TO GET TRANSPORTATION AND THE FORM NEED TO BE FILLED OUT AND FAXED BACK BY MONDAY 07/24/2023. PATIENT WOULD LIKE A CALL BACK TO BE INFORMED THE OFFICE RECEIVED THE FORM AND WOULD LIKE TO BE INFORMED WHEN THE FORM HAS BEEN FILLED OUT AND COMPLETED BY SONIA TORREZ

## 2023-09-08 ENCOUNTER — TRANSCRIBE ORDERS (OUTPATIENT)
Dept: ADMINISTRATIVE | Facility: HOSPITAL | Age: 48
End: 2023-09-08
Payer: COMMERCIAL

## 2023-09-08 DIAGNOSIS — R10.12 LEFT UPPER QUADRANT ABDOMINAL PAIN: Primary | ICD-10-CM

## 2023-09-22 ENCOUNTER — HOSPITAL ENCOUNTER (OUTPATIENT)
Dept: CT IMAGING | Facility: HOSPITAL | Age: 48
Discharge: HOME | End: 2023-09-22
Admitting: SURGERY
Payer: COMMERCIAL

## 2023-09-22 DIAGNOSIS — R10.12 LEFT UPPER QUADRANT ABDOMINAL PAIN: ICD-10-CM

## 2023-09-22 PROCEDURE — 74177 CT ABD & PELVIS W/CONTRAST: CPT

## 2023-09-22 PROCEDURE — 25510000001 IOPAMIDOL 61 % SOLUTION: Performed by: SURGERY

## 2023-09-22 RX ADMIN — IOPAMIDOL 36 ML: 612 INJECTION, SOLUTION INTRAVENOUS at 10:04

## 2023-11-27 ENCOUNTER — OFFICE VISIT (OUTPATIENT)
Dept: BEHAVIORAL HEALTH | Facility: CLINIC | Age: 48
End: 2023-11-27
Payer: COMMERCIAL

## 2023-11-27 VITALS
DIASTOLIC BLOOD PRESSURE: 80 MMHG | WEIGHT: 222 LBS | BODY MASS INDEX: 34.84 KG/M2 | HEART RATE: 92 BPM | SYSTOLIC BLOOD PRESSURE: 128 MMHG | OXYGEN SATURATION: 98 % | HEIGHT: 67 IN

## 2023-11-27 DIAGNOSIS — F31.9 BIPOLAR 1 DISORDER: Primary | ICD-10-CM

## 2023-11-27 DIAGNOSIS — F90.2 ATTENTION DEFICIT HYPERACTIVITY DISORDER (ADHD), COMBINED TYPE: ICD-10-CM

## 2023-11-27 DIAGNOSIS — F41.1 GENERALIZED ANXIETY DISORDER: ICD-10-CM

## 2023-11-27 DIAGNOSIS — G47.20 CIRCADIAN RHYTHM SLEEP DISORDER, UNSPECIFIED TYPE: ICD-10-CM

## 2023-11-27 DIAGNOSIS — F43.10 POST TRAUMATIC STRESS DISORDER (PTSD): ICD-10-CM

## 2023-11-27 PROBLEM — D35.1 PARATHYROID ADENOMA: Status: RESOLVED | Noted: 2018-05-01 | Resolved: 2023-11-27

## 2023-11-27 PROBLEM — M81.0 OSTEOPOROSIS: Status: ACTIVE | Noted: 2018-05-01

## 2023-11-27 PROBLEM — D50.9 IRON DEFICIENCY ANEMIA: Status: ACTIVE | Noted: 2020-10-23

## 2023-11-27 PROBLEM — K83.1 STRICTURE OF BILE DUCT: Status: RESOLVED | Noted: 2023-03-14 | Resolved: 2023-11-27

## 2023-11-27 PROBLEM — E04.1 THYROID NODULE: Status: RESOLVED | Noted: 2018-10-24 | Resolved: 2023-11-27

## 2023-11-27 PROBLEM — I89.0 LYMPHEDEMA: Status: ACTIVE | Noted: 2022-02-17

## 2023-11-27 PROBLEM — E55.9 VITAMIN D DEFICIENCY: Status: RESOLVED | Noted: 2017-12-13 | Resolved: 2023-11-27

## 2023-11-27 PROBLEM — K64.4 EXTERNAL HEMORRHOID: Status: ACTIVE | Noted: 2022-03-29

## 2023-11-27 PROCEDURE — 90792 PSYCH DIAG EVAL W/MED SRVCS: CPT | Performed by: NURSE PRACTITIONER

## 2023-11-27 PROCEDURE — 1160F RVW MEDS BY RX/DR IN RCRD: CPT | Performed by: NURSE PRACTITIONER

## 2023-11-27 PROCEDURE — 1159F MED LIST DOCD IN RCRD: CPT | Performed by: NURSE PRACTITIONER

## 2023-11-27 RX ORDER — BUSPIRONE HYDROCHLORIDE 10 MG/1
10 TABLET ORAL 3 TIMES DAILY
Qty: 90 TABLET | Refills: 1 | Status: SHIPPED | OUTPATIENT
Start: 2023-11-27

## 2023-11-27 RX ORDER — LAMOTRIGINE 25 MG/1
TABLET ORAL
Qty: 60 TABLET | Refills: 1 | Status: SHIPPED | OUTPATIENT
Start: 2023-11-27

## 2023-11-27 RX ORDER — AMITRIPTYLINE HYDROCHLORIDE 100 MG/1
100 TABLET ORAL
Qty: 30 TABLET | Refills: 1 | Status: SHIPPED | OUTPATIENT
Start: 2023-11-27

## 2023-11-27 RX ORDER — DULOXETIN HYDROCHLORIDE 60 MG/1
CAPSULE, DELAYED RELEASE ORAL
Qty: 60 CAPSULE | Refills: 1 | Status: SHIPPED | OUTPATIENT
Start: 2023-11-27

## 2023-11-27 RX ORDER — TRAZODONE HYDROCHLORIDE 150 MG/1
150 TABLET ORAL
Qty: 30 TABLET | Refills: 1 | Status: SHIPPED | OUTPATIENT
Start: 2023-11-27
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
## 2023-11-28 LAB
AMPHET+METHAMPHET UR QL: NEGATIVE
AMPHETAMINE INTERNAL CONTROL: ABNORMAL
AMPHETAMINES UR QL: NEGATIVE
BARBITURATE INTERNAL CONTROL: ABNORMAL
BARBITURATES UR QL SCN: NEGATIVE
BENZODIAZ UR QL SCN: POSITIVE
BENZODIAZEPINE INTERNAL CONTROL: ABNORMAL
BUPRENORPHINE INTERNAL CONTROL: ABNORMAL
BUPRENORPHINE SERPL-MCNC: NEGATIVE NG/ML
CANNABINOIDS SERPL QL: POSITIVE
COCAINE INTERNAL CONTROL: ABNORMAL
COCAINE UR QL: NEGATIVE
EXPIRATION DATE: ABNORMAL
Lab: ABNORMAL
MDMA (ECSTASY) INTERNAL CONTROL: ABNORMAL
MDMA UR QL SCN: NEGATIVE
METHADONE INTERNAL CONTROL: ABNORMAL
METHADONE UR QL SCN: NEGATIVE
METHAMPHETAMINE INTERNAL CONTROL: ABNORMAL
OPIATES INTERNAL CONTROL: ABNORMAL
OPIATES UR QL: NEGATIVE
OXYCODONE INTERNAL CONTROL: ABNORMAL
OXYCODONE UR QL SCN: POSITIVE
PCP UR QL SCN: NEGATIVE
PHENCYCLIDINE INTERNAL CONTROL: ABNORMAL
THC INTERNAL CONTROL: ABNORMAL

## 2023-11-29 ENCOUNTER — TELEPHONE (OUTPATIENT)
Dept: BEHAVIORAL HEALTH | Facility: CLINIC | Age: 48
End: 2023-11-29
Payer: COMMERCIAL

## 2023-11-29 NOTE — TELEPHONE ENCOUNTER
Patient called and left a voicemail asking about an ADD/ADHD medication that did not get called in?

## 2023-11-30 ENCOUNTER — TELEPHONE (OUTPATIENT)
Dept: BEHAVIORAL HEALTH | Facility: CLINIC | Age: 48
End: 2023-11-30
Payer: COMMERCIAL

## 2023-11-30 NOTE — TELEPHONE ENCOUNTER
PATIENT WAS SEEN FOR INITIAL VISIT ON 11/27/23.    STATED YOU ALL TALKED ABOUT PUTTING HER ON SOMETHING FOR ADHD.     SHE HAS PICKED UP MEDICATIONS FROM PHARMACY, BUT THOUGHT THERE WAS SUPPOSED TO BE A MEDICATION CALLED IN FOR HER ADHD.

## 2023-12-07 ENCOUNTER — OFFICE VISIT (OUTPATIENT)
Dept: BEHAVIORAL HEALTH | Facility: CLINIC | Age: 48
End: 2023-12-07
Payer: COMMERCIAL

## 2023-12-07 DIAGNOSIS — F43.10 POST TRAUMATIC STRESS DISORDER (PTSD): ICD-10-CM

## 2023-12-07 DIAGNOSIS — F31.81 BIPOLAR 2 DISORDER, MAJOR DEPRESSIVE EPISODE: ICD-10-CM

## 2023-12-07 DIAGNOSIS — F41.1 GENERALIZED ANXIETY DISORDER: Primary | ICD-10-CM
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
## 2023-12-27 DIAGNOSIS — F31.9 BIPOLAR 1 DISORDER: ICD-10-CM

## 2023-12-27 RX ORDER — LAMOTRIGINE 25 MG/1
TABLET ORAL
Qty: 60 TABLET | Refills: 1 | OUTPATIENT
Start: 2023-12-27

## 2024-01-04 ENCOUNTER — OFFICE VISIT (OUTPATIENT)
Dept: BEHAVIORAL HEALTH | Facility: CLINIC | Age: 49
End: 2024-01-04
Payer: COMMERCIAL

## 2024-01-04 DIAGNOSIS — F31.81 BIPOLAR 2 DISORDER, MAJOR DEPRESSIVE EPISODE: Primary | ICD-10-CM

## 2024-01-04 DIAGNOSIS — F43.10 POST TRAUMATIC STRESS DISORDER (PTSD): ICD-10-CM

## 2024-01-04 DIAGNOSIS — F41.1 GENERALIZED ANXIETY DISORDER: ICD-10-CM
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
## 2024-01-26 DIAGNOSIS — F41.1 GENERALIZED ANXIETY DISORDER: ICD-10-CM

## 2024-01-26 RX ORDER — BUSPIRONE HYDROCHLORIDE 10 MG/1
10 TABLET ORAL 3 TIMES DAILY
Qty: 90 TABLET | Refills: 1 | OUTPATIENT
Start: 2024-01-26

## 2024-01-29 ENCOUNTER — OFFICE VISIT (OUTPATIENT)
Dept: BEHAVIORAL HEALTH | Facility: CLINIC | Age: 49
End: 2024-01-29
Payer: COMMERCIAL

## 2024-01-29 VITALS
SYSTOLIC BLOOD PRESSURE: 126 MMHG | HEIGHT: 67 IN | OXYGEN SATURATION: 98 % | WEIGHT: 235 LBS | DIASTOLIC BLOOD PRESSURE: 74 MMHG | BODY MASS INDEX: 36.88 KG/M2 | HEART RATE: 82 BPM

## 2024-01-29 DIAGNOSIS — G47.20 CIRCADIAN RHYTHM SLEEP DISORDER, UNSPECIFIED TYPE: ICD-10-CM

## 2024-01-29 DIAGNOSIS — F41.1 GENERALIZED ANXIETY DISORDER: ICD-10-CM

## 2024-01-29 DIAGNOSIS — F90.2 ATTENTION DEFICIT HYPERACTIVITY DISORDER (ADHD), COMBINED TYPE: ICD-10-CM

## 2024-01-29 DIAGNOSIS — E66.9 OBESITY (BMI 30-39.9): ICD-10-CM

## 2024-01-29 DIAGNOSIS — F43.10 POST TRAUMATIC STRESS DISORDER (PTSD): ICD-10-CM

## 2024-01-29 DIAGNOSIS — F31.9 BIPOLAR 1 DISORDER: Primary | ICD-10-CM

## 2024-01-29 LAB
BUPRENORPHINE SAL CFM-MCNC: NEGATIVE NG/ML
METHYLENEDIOXYAMPHETAMINE: NEGATIVE
POC AMPHETAMINES: NEGATIVE
POC BARBITURATES: NEGATIVE
POC BENZODIAZEPHINES: NEGATIVE
POC COCAINE: NEGATIVE
POC METHADONE: NEGATIVE
POC METHAMPHETAMINE SCREEN URINE: NEGATIVE
POC OPIATES: POSITIVE
POC OXYCODONE: POSITIVE
POC PHENCYCLIDINE: NEGATIVE
POC THC: POSITIVE

## 2024-01-29 PROCEDURE — 99214 OFFICE O/P EST MOD 30 MIN: CPT | Performed by: NURSE PRACTITIONER

## 2024-01-29 PROCEDURE — 1159F MED LIST DOCD IN RCRD: CPT | Performed by: NURSE PRACTITIONER

## 2024-01-29 PROCEDURE — 1160F RVW MEDS BY RX/DR IN RCRD: CPT | Performed by: NURSE PRACTITIONER

## 2024-01-29 RX ORDER — TRAZODONE HYDROCHLORIDE 150 MG/1
150 TABLET ORAL
Qty: 30 TABLET | Refills: 2 | Status: SHIPPED | OUTPATIENT
Start: 2024-01-29

## 2024-01-29 RX ORDER — LAMOTRIGINE 100 MG/1
100 TABLET ORAL DAILY
Qty: 30 TABLET | Refills: 2 | Status: SHIPPED | OUTPATIENT
Start: 2024-01-29

## 2024-01-29 RX ORDER — DULOXETIN HYDROCHLORIDE 60 MG/1
CAPSULE, DELAYED RELEASE ORAL
Qty: 60 CAPSULE | Refills: 2 | Status: SHIPPED | OUTPATIENT
Start: 2024-01-29

## 2024-01-29 RX ORDER — TOPIRAMATE 25 MG/1
TABLET ORAL
Qty: 30 TABLET | Refills: 2 | Status: SHIPPED | OUTPATIENT
Start: 2024-01-29

## 2024-01-29 RX ORDER — AMITRIPTYLINE HYDROCHLORIDE 100 MG/1
100 TABLET ORAL
Qty: 30 TABLET | Refills: 2 | Status: SHIPPED | OUTPATIENT
Start: 2024-01-29

## 2024-01-29 NOTE — PROGRESS NOTES
Follow Up Office Visit      Patient Name: Shelley Vo  : 1975   MRN: [id number]     Referring Provider: Tracie Nettles PA-C    Chief Complaint:      ICD-10-CM ICD-9-CM   1. Bipolar 1 disorder  F31.9 296.7   2. Generalized anxiety disorder  F41.1 300.02   3. Obesity (BMI 30-39.9)  E66.9 278.00   4. Circadian rhythm sleep disorder, unspecified type  G47.20 327.30   5. Post traumatic stress disorder (PTSD)  F43.10 309.81   6. Attention deficit hyperactivity disorder (ADHD), combined type  F90.2 314.01        History of Present Illness:   Shelley Vo is a 48 y.o. female who is here today for follow up with psychiatric medications.    Subjective      Patient Reports:     I think the lamotrigine has helped my depression and moods.  Sleep is good too.  I thought decreasing the buspar would have been noticeable but it isn't at all.  I want to repeat my drug screen, I ate a THC brownie and forgot about it last time I was here.  Had some issues with suicidal thoughts for about 2 days-My daughter is having an issue with a domestic violence issue and she lives in [place] so when she doesn't answer me for a few days at a time I am so worried about her.  Do you think I can get something to help with adhd?  I have also gained about 50 pounds in the last 6 months, 13 since I was here last.  I can't stop eating sugar, I was doing so good with the 6 hour eating window and eating healthy and then it started up again.   I may need help with impulse control, I am eating at night when I wake up.  I was down to 185. I was trying to get to 155.  The elavil with the trazodone and duloxetine really helps me sleep well, have been taking this for a while now.        Review of Systems:   Review of Systems   Psychiatric/Behavioral:  Positive for decreased concentration, depressed mood and stress.        Screening Scores:   PHQ-9 : 13  CARMEN-7 : 15    RISK ASSESSMENT:  Patient denies any high risk factors today.    Medications:      Current Outpatient Medications:     amitriptyline (ELAVIL) 100 MG tablet, Take 1 tablet by mouth every night at bedtime., Disp: 30 tablet, Rfl: 2    cetirizine (zyrTEC) 10 MG tablet, Take 1 tablet by mouth Daily. For allergies, Disp: 90 tablet, Rfl: 3    clotrimazole-betamethasone (LOTRISONE) 1-0.05 % cream, Apply 1 application topically to the appropriate area as directed 2 (Two) Times a Day., Disp: 30 g, Rfl: 5    DULoxetine (CYMBALTA) 60 MG capsule, Take 2 capsules by mouth once daily., Disp: 60 capsule, Rfl: 2    fluticasone (FLONASE) 50 MCG/ACT nasal spray, 2 sprays by Each Nare route Daily. For allergies, Disp: 11.1 mL, Rfl: 10    hydroCHLOROthiazide (HYDRODIURIL) 50 MG tablet, Take 1 tablet by mouth Daily., Disp: , Rfl:     oxyCODONE-acetaminophen (PERCOCET)  MG per tablet, Take 1 tablet by mouth 4 (Four) Times a Day., Disp: , Rfl:     polyethylene glycol (MIRALAX) 17 g packet, Take 17 g by mouth Daily., Disp: 30 each, Rfl: 2    traZODone (DESYREL) 150 MG tablet, Take 1 tablet by mouth every night at bedtime., Disp: 30 tablet, Rfl: 2    VITAMIN C, CALCIUM ASCORBATE, PO, Take  by mouth., Disp: , Rfl:     lamoTRIgine (LaMICtal) 100 MG tablet, Take 1 tablet by mouth Daily., Disp: 30 tablet, Rfl: 2    topiramate (TOPAMAX) 25 MG tablet, Take 1 tablet by mouth every evening., Disp: 30 tablet, Rfl: 2    Medication Considerations:  EWA reviewed and appropriate.      Allergies:   Allergies   Allergen Reactions    Levofloxacin Unknown - Low Severity     Other reaction(s): joints hurt post taking    Nsaids Other (See Comments)     Hx ulcers  Other reaction(s): Other (See Comments)  Pt states upset stomach  Hx ulcers  Pt states upset stomach    contraindicated due to stomach ulcersPt states upset stomach  contraindicated due to stomach ulcersPt states upset stomach      Adhesive Tape Rash     Surgical tape - bruising and rash  Surgical tape - bruising and rash         Objective     Physical Exam:  Vital  "Signs:   Vitals:    01/29/24 0939   BP: 126/74   Pulse: 82   SpO2: 98%   Weight: 107 kg (235 lb)   Height: 170.2 cm (67\")     Body mass index is 36.81 kg/m².     Mental Status Exam:   Hygiene:   good  Cooperation:  Cooperative  Eye Contact:  Good  Psychomotor Behavior:  Appropriate  Affect:  Restricted  Mood: anxious  Speech:  Normal  Thought Process:  Goal directed and Linear  Thought Content:  Mood congruent  Suicidal:  None  Homicidal:  None  Hallucinations:  None  Delusion:  None  Memory:  Intact  Orientation:  Grossly intact  Reliability:  fair  Insight:  Fair  Judgement:  Fair  Impulse Control:  Fair  Physical/Medical Issues:  Yes gaining weight      Assessment / Plan      Visit Diagnosis/Orders Placed This Visit:  Diagnoses and all orders for this visit:    1. Bipolar 1 disorder (Primary)  -     lamoTRIgine (LaMICtal) 100 MG tablet; Take 1 tablet by mouth Daily.  Dispense: 30 tablet; Refill: 2  -     DULoxetine (CYMBALTA) 60 MG capsule; Take 2 capsules by mouth once daily.  Dispense: 60 capsule; Refill: 2  -     topiramate (TOPAMAX) 25 MG tablet; Take 1 tablet by mouth every evening.  Dispense: 30 tablet; Refill: 2    2. Generalized anxiety disorder  -     lamoTRIgine (LaMICtal) 100 MG tablet; Take 1 tablet by mouth Daily.  Dispense: 30 tablet; Refill: 2  -     DULoxetine (CYMBALTA) 60 MG capsule; Take 2 capsules by mouth once daily.  Dispense: 60 capsule; Refill: 2  -     amitriptyline (ELAVIL) 100 MG tablet; Take 1 tablet by mouth every night at bedtime.  Dispense: 30 tablet; Refill: 2  -     traZODone (DESYREL) 150 MG tablet; Take 1 tablet by mouth every night at bedtime.  Dispense: 30 tablet; Refill: 2    3. Obesity (BMI 30-39.9)  -     topiramate (TOPAMAX) 25 MG tablet; Take 1 tablet by mouth every evening.  Dispense: 30 tablet; Refill: 2  -     POC Urine Drug Screen, Triage    4. Circadian rhythm sleep disorder, unspecified type  -     amitriptyline (ELAVIL) 100 MG tablet; Take 1 tablet by mouth every " night at bedtime.  Dispense: 30 tablet; Refill: 2  -     traZODone (DESYREL) 150 MG tablet; Take 1 tablet by mouth every night at bedtime.  Dispense: 30 tablet; Refill: 2    5. Post traumatic stress disorder (PTSD)  -     traZODone (DESYREL) 150 MG tablet; Take 1 tablet by mouth every night at bedtime.  Dispense: 30 tablet; Refill: 2    6. Attention deficit hyperactivity disorder (ADHD), combined type  -     POC Urine Drug Screen, Triage         Functional Status: Moderate impairment     Prognosis: Guarded with Ongoing Treatment    Impression/Formulation:  Patient appeared alert and oriented. Patient is receptive to assistance with maintaining a stable lifestyle.  Patient presents with history of     ICD-10-CM ICD-9-CM   1. Bipolar 1 disorder  F31.9 296.7   2. Generalized anxiety disorder  F41.1 300.02   3. Obesity (BMI 30-39.9)  E66.9 278.00   4. Circadian rhythm sleep disorder, unspecified type  G47.20 327.30   5. Post traumatic stress disorder (PTSD)  F43.10 309.81   6. Attention deficit hyperactivity disorder (ADHD), combined type  F90.2 314.01   .   Patient struggles with overeating, feeling impulsive, anxiety, and depressive symptoms related to her daughter and feeling guilty.  UDS repeat today: +THC, +Oxy, +Opioids. Unable to start any controlled substances today.    Treatment Plan:   Repeat UDS  Wean off buspar-not effective  Increase lamotrigine for mood and anxiety  Start topiramate for weight management, significant weight gain with medications.  Start phentermine if UDS appropriate.  Advised to check with pain clinic to verify other controlled substances will be allowed.      Patient will continue supportive psychotherapy efforts and medications as indicated. Clinic will obtain release of information for current treatment team for continuity of care as needed. Patient will contact this office, call 911 or present to the nearest emergency room should suicidal or homicidal ideations occur.  Discussed  medication options and treatment plan of prescribed medication(s) as well as the risks, benefits, and potential side effects. Patient ackowledged and verbally consented to continue with current treatment plan and was educated on the importance of compliance with treatment and follow-up appointments.     Follow Up:   Return in about 12 weeks (around 4/22/2024) for Med Check.        ANDREA Daniels, PMHNP-BC  [facility]     This is electronically signed by ANDREA Daniels, MATEO-BC  [unfilled] 11:23 EST

## 2024-02-09 ENCOUNTER — TELEPHONE (OUTPATIENT)
Dept: FAMILY MEDICINE CLINIC | Facility: CLINIC | Age: 49
End: 2024-02-09

## 2024-02-09 NOTE — TELEPHONE ENCOUNTER
Caller: Shelley Vo    Relationship to patient: Self    Best call back number: [phone]     Patient is needing: PATIENT WOULD LIKE TO SEE ABOUT RECEIVING A HANDICAP PLACARD, BUT THEY AREN'T SURE HOW TO GO ABOUT GETTING ONE FOR THEIR LYMPHEDEMA AND ARTHRITIS    PLEASE ADVISE

## 2024-02-27 ENCOUNTER — TELEPHONE (OUTPATIENT)
Dept: BEHAVIORAL HEALTH | Facility: CLINIC | Age: 49
End: 2024-02-27
Payer: COMMERCIAL

## 2024-03-28 DIAGNOSIS — E66.9 OBESITY (BMI 30-39.9): ICD-10-CM

## 2024-03-28 DIAGNOSIS — F31.9 BIPOLAR 1 DISORDER: ICD-10-CM

## 2024-03-28 RX ORDER — TOPIRAMATE 25 MG/1
TABLET ORAL
Qty: 30 TABLET | Refills: 2 | OUTPATIENT
Start: 2024-03-28

## 2024-04-11 ENCOUNTER — OFFICE VISIT (OUTPATIENT)
Dept: FAMILY MEDICINE CLINIC | Facility: CLINIC | Age: 49
End: 2024-04-11
Payer: COMMERCIAL

## 2024-04-11 VITALS
SYSTOLIC BLOOD PRESSURE: 120 MMHG | HEART RATE: 81 BPM | HEIGHT: 67 IN | BODY MASS INDEX: 34.53 KG/M2 | DIASTOLIC BLOOD PRESSURE: 90 MMHG | WEIGHT: 220 LBS | OXYGEN SATURATION: 95 %

## 2024-04-11 DIAGNOSIS — M79.672 LEFT FOOT PAIN: Primary | ICD-10-CM

## 2024-04-11 PROCEDURE — 1159F MED LIST DOCD IN RCRD: CPT | Performed by: PHYSICIAN ASSISTANT

## 2024-04-11 PROCEDURE — 99213 OFFICE O/P EST LOW 20 MIN: CPT | Performed by: PHYSICIAN ASSISTANT

## 2024-04-11 PROCEDURE — 1160F RVW MEDS BY RX/DR IN RCRD: CPT | Performed by: PHYSICIAN ASSISTANT

## 2024-04-11 NOTE — PROGRESS NOTES
"Chief Complaint  Toe Injury (Left toe hit on box last night )    Subjective        Shelley Vo presents to [facility]  History of Present Illness  Patient reports today secondary to having pain in her left foot.  Patient states last night she hit her foot on a box and has had pain since.  Reports having some swelling and feels that her third toe is bent to the left.  Patient reports she takes pain medication for another issue and it has not helped with the foot.  Patient reports she has been avoiding putting pressure on the left foot.  Denies any previous injury  Toe Injury        Objective   Vital Signs:  /90   Pulse 81   Ht 170.2 cm (67\")   Wt 99.8 kg (220 lb)   SpO2 95%   BMI 34.46 kg/m²   Estimated body mass index is 34.46 kg/m² as calculated from the following:    Height as of this encounter: 170.2 cm (67\").    Weight as of this encounter: 99.8 kg (220 lb).             Physical Exam  Vitals and nursing note reviewed.   Constitutional:       General: She is not in acute distress.     Appearance: She is not ill-appearing.   Cardiovascular:      Rate and Rhythm: Normal rate and regular rhythm.   Pulmonary:      Effort: Pulmonary effort is normal. No respiratory distress.   Musculoskeletal:         General: Normal range of motion.      Comments: Patient with mild tenderness to fifth metatarsal.  Patient with moderate tenderness upon palpation to the third and fourth metatarsal and third and fourth toes.  There is some edema and ecchymosis to the third toe.   Skin:     General: Skin is warm and dry.   Neurological:      Gait: Gait normal.   Psychiatric:         Mood and Affect: Mood normal.        Result Review :                     Assessment and Plan     Diagnoses and all orders for this visit:    1. Left foot pain (Primary)  Assessment & Plan:  Will get x-rays this date.  Advised patient if there is a fracture she will need to contact orthopedic for further workup.  " Provided her with contact information.  Recommended supportive shoe and jason taping should x-ray returned normal.  Continue elevation and ice.  Patient knowledge understanding    Orders:  -     XR Foot 3+ View Left; Future             Follow Up     Return if symptoms worsen or fail to improve.  Patient was given instructions and counseling regarding her condition or for health maintenance advice. Please see specific information pulled into the AVS if appropriate.

## 2024-04-11 NOTE — ASSESSMENT & PLAN NOTE
Will get x-rays this date.  Advised patient if there is a fracture she will need to contact orthopedic for further workup.  Provided her with contact information.  Recommended supportive shoe and jason taping should x-ray returned normal.  Continue elevation and ice.  Patient knowledge understanding

## 2024-04-12 ENCOUNTER — TELEPHONE (OUTPATIENT)
Dept: FAMILY MEDICINE CLINIC | Facility: CLINIC | Age: 49
End: 2024-04-12

## 2024-04-12 NOTE — TELEPHONE ENCOUNTER
Caller: Shelley Vo    Relationship: Self    Best call back number: [phone]     What is the medical concern/diagnosis: BROKEN TOE     What specialty or service is being requested: ORTHOPEDIC SURGEON     What is the provider, practice or medical service name:   [email or web address]  [address] , [place] [id number]   PHONE: [phone]    Any additional details:   PATIENT STATED THAT THE ORTHOPEDIC SURGEON THAT SHE WAS REFERRED TO BY SONIA LAY PA-C IS NOT ACCEPTING HER INSURANCE AND PATIENT STATED THAT SHE SPOKE WITH [facility] AND THEY NEED A REFERRAL TO BE PLACED IN ORDER TO SEE THE PATIENT       
30

## 2024-04-12 NOTE — TELEPHONE ENCOUNTER
Caller: Shleley Vo    Relationship: Self    Best call back number: [phone]     What test was performed: XR FOOT 3+ VIEW LEFT     When was the test performed: 04/11/2024    Additional notes: PATIENT WOULD LIKE A CALL BACK TO BE INFORMED OF THE RESULTS OF HER XRAY DONE YESTERDAY 04/11/2024

## 2024-04-14 DIAGNOSIS — S92.422D CLOSED DISPLACED FRACTURE OF DISTAL PHALANX OF LEFT GREAT TOE WITH ROUTINE HEALING, SUBSEQUENT ENCOUNTER: Primary | ICD-10-CM

## 2024-04-15 ENCOUNTER — OFFICE VISIT (OUTPATIENT)
Dept: FAMILY MEDICINE CLINIC | Facility: CLINIC | Age: 49
End: 2024-04-15
Payer: COMMERCIAL

## 2024-04-15 VITALS
HEART RATE: 102 BPM | HEIGHT: 67 IN | BODY MASS INDEX: 38.39 KG/M2 | SYSTOLIC BLOOD PRESSURE: 140 MMHG | WEIGHT: 244.6 LBS | OXYGEN SATURATION: 98 % | DIASTOLIC BLOOD PRESSURE: 90 MMHG

## 2024-04-15 DIAGNOSIS — R63.5 EXCESSIVE WEIGHT GAIN: Primary | ICD-10-CM

## 2024-04-15 DIAGNOSIS — E66.01 CLASS 2 SEVERE OBESITY DUE TO EXCESS CALORIES WITH SERIOUS COMORBIDITY AND BODY MASS INDEX (BMI) OF 38.0 TO 38.9 IN ADULT: ICD-10-CM

## 2024-04-15 DIAGNOSIS — Z98.84 STATUS POST GASTRIC BYPASS FOR OBESITY: ICD-10-CM

## 2024-04-15 PROCEDURE — 1159F MED LIST DOCD IN RCRD: CPT | Performed by: PHYSICIAN ASSISTANT

## 2024-04-15 PROCEDURE — 1160F RVW MEDS BY RX/DR IN RCRD: CPT | Performed by: PHYSICIAN ASSISTANT

## 2024-04-15 PROCEDURE — 99214 OFFICE O/P EST MOD 30 MIN: CPT | Performed by: PHYSICIAN ASSISTANT

## 2024-04-15 NOTE — PROGRESS NOTES
Office Note     Name: Shelley Vo    : 1975     MRN: [id number]     Chief Complaint  Weight Loss    Subjective     History of Present Illness:  Shelley Vo is a 48 y.o. female who presents today for eval of weight gain. She states that she had maintained around 185 lbs until around last November, and then she has gained weight rapidly.  She denies any changes in her lifestyle or diet.  She states that she initially had bariatric surgery in , and she had to have 2 revisions of the surgery.  She denies any swelling or worsening lymphedema.      Review of Systems:   Review of Systems   All other systems reviewed and are negative.      Past Medical History:   Past Medical History:   Diagnosis Date    Parathyroid adenoma 2018    Stricture of bile duct 2023    Thyroid nodule 10/24/2018    Vitamin D deficiency 2017       Past Surgical History:   Past Surgical History:   Procedure Laterality Date    ABDOMINAL SURGERY      due to ulcers     SECTION      COLON RESECTION      GALLBLADDER SURGERY      GASTRIC BYPASS         Family History: History reviewed. No pertinent family history.    Social History:   Social History     Socioeconomic History    Marital status:    Tobacco Use    Smoking status: Never    Smokeless tobacco: Never   Vaping Use    Vaping status: Never Used   Substance and Sexual Activity    Alcohol use: Not Currently    Drug use: Never    Sexual activity: Yes     Partners: Male     Birth control/protection: Tubal ligation       Immunizations:   Immunization History   Administered Date(s) Administered    Fluzone (or Fluarix & Flulaval for VFC) >6mos 12/15/2015, 2016, 10/31/2017, 10/15/2019    Influenza Seasonal Injectable 2016    Influenza, Unspecified 2018, 10/30/2020    Tdap 2016, 2017        Medications:     Current Outpatient Medications:     amitriptyline (ELAVIL) 100 MG tablet, Take 1 tablet by mouth every night at bedtime.,  "Disp: 30 tablet, Rfl: 2    cetirizine (zyrTEC) 10 MG tablet, Take 1 tablet by mouth Daily. For allergies, Disp: 90 tablet, Rfl: 3    clotrimazole-betamethasone (LOTRISONE) 1-0.05 % cream, Apply 1 application topically to the appropriate area as directed 2 (Two) Times a Day., Disp: 30 g, Rfl: 5    DULoxetine (CYMBALTA) 60 MG capsule, Take 2 capsules by mouth once daily., Disp: 60 capsule, Rfl: 2    fluticasone (FLONASE) 50 MCG/ACT nasal spray, 2 sprays by Each Nare route Daily. For allergies, Disp: 11.1 mL, Rfl: 10    hydroCHLOROthiazide (HYDRODIURIL) 50 MG tablet, Take 1 tablet by mouth Daily., Disp: , Rfl:     lamoTRIgine (LaMICtal) 100 MG tablet, Take 1 tablet by mouth Daily., Disp: 30 tablet, Rfl: 2    oxyCODONE-acetaminophen (PERCOCET)  MG per tablet, Take 1 tablet by mouth 4 (Four) Times a Day., Disp: , Rfl:     polyethylene glycol (MIRALAX) 17 g packet, Take 17 g by mouth Daily., Disp: 30 each, Rfl: 2    topiramate (TOPAMAX) 25 MG tablet, Take 1 tablet by mouth every evening., Disp: 30 tablet, Rfl: 2    traZODone (DESYREL) 150 MG tablet, Take 1 tablet by mouth every night at bedtime., Disp: 30 tablet, Rfl: 2    VITAMIN C, CALCIUM ASCORBATE, PO, Take  by mouth., Disp: , Rfl:     Allergies:   Allergies   Allergen Reactions    Levofloxacin Unknown - Low Severity     Other reaction(s): joints hurt post taking    Nsaids Other (See Comments)     Hx ulcers  Other reaction(s): Other (See Comments)  Pt states upset stomach  Hx ulcers  Pt states upset stomach    contraindicated due to stomach ulcersPt states upset stomach  contraindicated due to stomach ulcersPt states upset stomach      Adhesive Tape Rash     Surgical tape - bruising and rash  Surgical tape - bruising and rash         Objective     Vital Signs  /90 (BP Location: Right arm, Patient Position: Sitting, Cuff Size: Large Adult)   Pulse 102   Ht 170.2 cm (67\")   Wt 111 kg (244 lb 9.6 oz)   SpO2 98%   BMI 38.31 kg/m²   Estimated body mass " "index is 38.31 kg/m² as calculated from the following:    Height as of this encounter: 170.2 cm (67\").    Weight as of this encounter: 111 kg (244 lb 9.6 oz).      Physical Exam  Vitals and nursing note reviewed.   Constitutional:       General: She is not in acute distress.     Appearance: Normal appearance. She is not ill-appearing.   HENT:      Head: Normocephalic and atraumatic.   Cardiovascular:      Rate and Rhythm: Regular rhythm.      Pulses: Normal pulses.      Heart sounds: Normal heart sounds.   Pulmonary:      Effort: Pulmonary effort is normal. No respiratory distress.      Breath sounds: Normal breath sounds.   Neurological:      General: No focal deficit present.      Mental Status: She is alert and oriented to person, place, and time.      Gait: Gait normal.   Psychiatric:         Behavior: Behavior normal.          Assessment and Plan     Assessment/Plan:  Diagnoses and all orders for this visit:    1. Excessive weight gain (Primary)  Assessment & Plan:  Her weight gain seems to be correlated with when she started the lamotrigine, so I recommend that she discuss this with ANDREA Daniels.    We will also schedule with weight management, and I encouraged diet and exercise improvement.      2. Class 2 severe obesity due to excess calories with serious comorbidity and body mass index (BMI) of 38.0 to 38.9 in adult  Assessment & Plan:  Patient's (Body mass index is 38.31 kg/m².) indicates that they are morbidly/severely obese (BMI > 40 or > 35 with obesity - related health condition) with health conditions that include osteoarthritis and lymphedema  . Weight is worsening. BMI  is above average; BMI management plan is completed. We discussed portion control, increasing exercise, and consulting weight management .  Part of her weight gain is likely due to the lamotrigine, which she started in November, the months when she noticed a steep increase in weight.  I advised that she discuss this with her " behavioral health provider, and she expressed understanding.    Orders:  -     Ambulatory Referral to Weight Management Program    3. Status post gastric bypass for obesity  Assessment & Plan:  He has a history of bariatric surgery with 2 revisions.  She has continued to gain weight, so I recommend that she see weight management for nutritionist and alternative plan.    Orders:  -     Ambulatory Referral to Weight Management Program        Follow Up  Return for next scheduled follow up.    Ella Barrientos PA-C  [facility]

## 2024-04-16 PROBLEM — E66.01 CLASS 2 SEVERE OBESITY DUE TO EXCESS CALORIES WITH SERIOUS COMORBIDITY AND BODY MASS INDEX (BMI) OF 38.0 TO 38.9 IN ADULT: Status: ACTIVE | Noted: 2023-04-27

## 2024-04-16 PROBLEM — E66.812 CLASS 2 SEVERE OBESITY DUE TO EXCESS CALORIES WITH SERIOUS COMORBIDITY AND BODY MASS INDEX (BMI) OF 38.0 TO 38.9 IN ADULT: Status: ACTIVE | Noted: 2023-04-27

## 2024-04-16 PROBLEM — R63.5 EXCESSIVE WEIGHT GAIN: Status: ACTIVE | Noted: 2024-04-16

## 2024-04-16 NOTE — ASSESSMENT & PLAN NOTE
Her weight gain seems to be correlated with when she started the lamotrigine, so I recommend that she discuss this with ANDREA Daniels.    We will also schedule with weight management, and I encouraged diet and exercise improvement.

## 2024-04-16 NOTE — ASSESSMENT & PLAN NOTE
He has a history of bariatric surgery with 2 revisions.  She has continued to gain weight, so I recommend that she see weight management for nutritionist and alternative plan.

## 2024-04-16 NOTE — ASSESSMENT & PLAN NOTE
Patient's (Body mass index is 38.31 kg/m².) indicates that they are morbidly/severely obese (BMI > 40 or > 35 with obesity - related health condition) with health conditions that include osteoarthritis and lymphedema  . Weight is worsening. BMI  is above average; BMI management plan is completed. We discussed portion control, increasing exercise, and consulting weight management .  Part of her weight gain is likely due to the lamotrigine, which she started in November, the months when she noticed a steep increase in weight.  I advised that she discuss this with her behavioral health provider, and she expressed understanding.

## 2024-04-18 ENCOUNTER — TELEPHONE (OUTPATIENT)
Dept: FAMILY MEDICINE CLINIC | Facility: CLINIC | Age: 49
End: 2024-04-18

## 2024-04-18 NOTE — TELEPHONE ENCOUNTER
Caller: Shelley Vo    Relationship: Self    Best call back number: [phone]     What specialty or service is being requested: PAIN MANAGEMENT    Any additional details: PATIENT HAS CALLED IN TO INFORM US THAT THE PAIN MANAGEMENT DOCTOR THAT THEY'RE SET TO SEE NEXT WEEK IS NEEDING A NEW REFERRAL FROM US TO CONTINUE WITH THAT APPOINTMENT    PATIENT STATES THAT THE [employer] STATES THAT THEY NEED THAT DOCUMENT AS WELL BY END OF DAY TOMORROW    PLEASE ADVISE

## 2024-04-22 ENCOUNTER — OFFICE VISIT (OUTPATIENT)
Dept: BEHAVIORAL HEALTH | Facility: CLINIC | Age: 49
End: 2024-04-22
Payer: COMMERCIAL

## 2024-04-22 VITALS
SYSTOLIC BLOOD PRESSURE: 128 MMHG | BODY MASS INDEX: 38.14 KG/M2 | WEIGHT: 243 LBS | DIASTOLIC BLOOD PRESSURE: 74 MMHG | OXYGEN SATURATION: 99 % | HEART RATE: 68 BPM | HEIGHT: 67 IN

## 2024-04-22 DIAGNOSIS — F43.10 POST TRAUMATIC STRESS DISORDER (PTSD): ICD-10-CM

## 2024-04-22 DIAGNOSIS — F41.1 GENERALIZED ANXIETY DISORDER: ICD-10-CM

## 2024-04-22 DIAGNOSIS — F31.9 BIPOLAR 1 DISORDER: Primary | ICD-10-CM

## 2024-04-22 DIAGNOSIS — G47.20 CIRCADIAN RHYTHM SLEEP DISORDER, UNSPECIFIED TYPE: ICD-10-CM

## 2024-04-22 DIAGNOSIS — E66.9 OBESITY (BMI 30-39.9): ICD-10-CM

## 2024-04-22 DIAGNOSIS — F90.2 ATTENTION DEFICIT HYPERACTIVITY DISORDER (ADHD), COMBINED TYPE: ICD-10-CM

## 2024-04-22 LAB
BUPRENORPHINE SERPL-MCNC: NEGATIVE NG/ML
MDMA UR QL SCN: NEGATIVE
POC AMPHETAMINES: NEGATIVE
POC BARBITURATES: NEGATIVE
POC BENZODIAZEPHINES: NEGATIVE
POC COCAINE: NEGATIVE
POC METHADONE: NEGATIVE
POC METHAMPHETAMINE SCREEN URINE: NEGATIVE
POC OPIATES: NEGATIVE
POC OXYCODONE: POSITIVE
POC PHENCYCLIDINE: NEGATIVE
POC THC: POSITIVE

## 2024-04-22 PROCEDURE — 99214 OFFICE O/P EST MOD 30 MIN: CPT | Performed by: NURSE PRACTITIONER

## 2024-04-22 PROCEDURE — 1159F MED LIST DOCD IN RCRD: CPT | Performed by: NURSE PRACTITIONER

## 2024-04-22 PROCEDURE — 1160F RVW MEDS BY RX/DR IN RCRD: CPT | Performed by: NURSE PRACTITIONER

## 2024-04-22 RX ORDER — DULOXETIN HYDROCHLORIDE 60 MG/1
CAPSULE, DELAYED RELEASE ORAL
Qty: 60 CAPSULE | Refills: 2 | Status: SHIPPED | OUTPATIENT
Start: 2024-04-22

## 2024-04-22 RX ORDER — AMITRIPTYLINE HYDROCHLORIDE 100 MG/1
100 TABLET ORAL
Qty: 30 TABLET | Refills: 2 | Status: SHIPPED | OUTPATIENT
Start: 2024-04-22

## 2024-04-22 RX ORDER — ATOMOXETINE 25 MG/1
25 CAPSULE ORAL DAILY
Qty: 30 CAPSULE | Refills: 2 | Status: SHIPPED | OUTPATIENT
Start: 2024-04-22

## 2024-04-22 RX ORDER — TOPIRAMATE 50 MG/1
50 TABLET, FILM COATED ORAL NIGHTLY
Qty: 30 TABLET | Refills: 2 | Status: SHIPPED | OUTPATIENT
Start: 2024-04-22

## 2024-04-22 RX ORDER — TRAZODONE HYDROCHLORIDE 150 MG/1
150 TABLET ORAL
Qty: 30 TABLET | Refills: 2 | Status: SHIPPED | OUTPATIENT
Start: 2024-04-22

## 2024-04-22 NOTE — PROGRESS NOTES
Follow Up Office Visit      Patient Name: Shelley Vo  : 1975   MRN: [id number]     Referring Provider: Tracie Nettles PA-C    Chief Complaint:      ICD-10-CM ICD-9-CM   1. Bipolar 1 disorder  F31.9 296.7   2. Generalized anxiety disorder  F41.1 300.02   3. Post traumatic stress disorder (PTSD)  F43.10 309.81   4. Attention deficit hyperactivity disorder (ADHD), combined type  F90.2 314.01   5. Obesity (BMI 30-39.9)  E66.9 278.00   6. Circadian rhythm sleep disorder, unspecified type  G47.20 327.30        History of Present Illness:   Shelley Vo is a 48 y.o. female who is here today for follow up with psychiatric medications.    Subjective      Patient Reports:   My appetite is still all over the place.  Been off the lamotrigine for a couple days, my other doctor thought it may be what was causing my over eating.  I have had a lifelong struggle with my weight, and last year I was down to 185.  It is making me very sad.  Can I try to take the phentermine.  I had that edible in my system last time but I have not had any since then.  It is so upsetting to me and I can't handle it.  I see Dr. Becerril for my pain medication.      Review of Systems:   Review of Systems   Psychiatric/Behavioral:  Positive for dysphoric mood and stress. The patient is nervous/anxious.        Screening Scores:   PHQ-9 : 10  CARMEN-7 : 11    RISK ASSESSMENT:  Patient denies any high risk factors today.    Medications:     Current Outpatient Medications:     amitriptyline (ELAVIL) 100 MG tablet, Take 1 tablet by mouth every night at bedtime., Disp: 30 tablet, Rfl: 2    cetirizine (zyrTEC) 10 MG tablet, Take 1 tablet by mouth Daily. For allergies, Disp: 90 tablet, Rfl: 3    DULoxetine (CYMBALTA) 60 MG capsule, Take 2 capsules by mouth once daily., Disp: 60 capsule, Rfl: 2    fluticasone (FLONASE) 50 MCG/ACT nasal spray, 2 sprays by Each Nare route Daily. For allergies, Disp: 11.1 mL, Rfl: 10    hydroCHLOROthiazide (HYDRODIURIL)  "50 MG tablet, Take 1 tablet by mouth Daily., Disp: , Rfl:     oxyCODONE-acetaminophen (PERCOCET)  MG per tablet, Take 1 tablet by mouth 4 (Four) Times a Day., Disp: , Rfl:     polyethylene glycol (MIRALAX) 17 g packet, Take 17 g by mouth Daily., Disp: 30 each, Rfl: 2    traZODone (DESYREL) 150 MG tablet, Take 1 tablet by mouth every night at bedtime., Disp: 30 tablet, Rfl: 2    VITAMIN C, CALCIUM ASCORBATE, PO, Take  by mouth., Disp: , Rfl:     atomoxetine (STRATTERA) 25 MG capsule, Take 1 capsule by mouth Daily., Disp: 30 capsule, Rfl: 2    clotrimazole-betamethasone (LOTRISONE) 1-0.05 % cream, Apply 1 application topically to the appropriate area as directed 2 (Two) Times a Day. (Patient not taking: Reported on 4/22/2024), Disp: 30 g, Rfl: 5    topiramate (TOPAMAX) 50 MG tablet, Take 1 tablet by mouth Every Night., Disp: 30 tablet, Rfl: 2    Medication Considerations:  EWA reviewed and appropriate.      Allergies:   Allergies   Allergen Reactions    Levofloxacin Unknown - Low Severity     Other reaction(s): joints hurt post taking    Nsaids Other (See Comments)     Hx ulcers  Other reaction(s): Other (See Comments)  Pt states upset stomach  Hx ulcers  Pt states upset stomach    contraindicated due to stomach ulcersPt states upset stomach  contraindicated due to stomach ulcersPt states upset stomach      Adhesive Tape Rash     Surgical tape - bruising and rash  Surgical tape - bruising and rash         Results Reviewed:   screeners and body composition report    Objective     Physical Exam:  Vital Signs:   Vitals:    04/22/24 1305   BP: 128/74   Pulse: 68   SpO2: 99%   Weight: 110 kg (243 lb)   Height: 170.2 cm (67\")     Body mass index is 38.06 kg/m².     Mental Status Exam:   Hygiene:   good  Cooperation:  Cooperative  Eye Contact:  Fair  Psychomotor Behavior:  Appropriate  Affect:   emotional-tearful  Mood: depressed and anxious  Speech:  Normal  Thought Process:  Goal directed and Linear  Thought " Content:  Mood congruent  Suicidal:  None  Homicidal:  None  Hallucinations:  None  Delusion:  None  Memory:  Intact  Orientation:  Grossly intact  Reliability:  fair  Insight:  Fair  Judgement:  Fair  Impulse Control:  Fair  Physical/Medical Issues:  Yes weight gain      Assessment / Plan      Visit Diagnosis/Orders Placed This Visit:  Diagnoses and all orders for this visit:    1. Bipolar 1 disorder (Primary)  -     DULoxetine (CYMBALTA) 60 MG capsule; Take 2 capsules by mouth once daily.  Dispense: 60 capsule; Refill: 2  -     topiramate (TOPAMAX) 50 MG tablet; Take 1 tablet by mouth Every Night.  Dispense: 30 tablet; Refill: 2    2. Generalized anxiety disorder  -     DULoxetine (CYMBALTA) 60 MG capsule; Take 2 capsules by mouth once daily.  Dispense: 60 capsule; Refill: 2  -     amitriptyline (ELAVIL) 100 MG tablet; Take 1 tablet by mouth every night at bedtime.  Dispense: 30 tablet; Refill: 2  -     topiramate (TOPAMAX) 50 MG tablet; Take 1 tablet by mouth Every Night.  Dispense: 30 tablet; Refill: 2  -     traZODone (DESYREL) 150 MG tablet; Take 1 tablet by mouth every night at bedtime.  Dispense: 30 tablet; Refill: 2    3. Post traumatic stress disorder (PTSD)  -     traZODone (DESYREL) 150 MG tablet; Take 1 tablet by mouth every night at bedtime.  Dispense: 30 tablet; Refill: 2    4. Attention deficit hyperactivity disorder (ADHD), combined type  -     atomoxetine (STRATTERA) 25 MG capsule; Take 1 capsule by mouth Daily.  Dispense: 30 capsule; Refill: 2    5. Obesity (BMI 30-39.9)  -     POC Urine Drug Screen, Triage  -     topiramate (TOPAMAX) 50 MG tablet; Take 1 tablet by mouth Every Night.  Dispense: 30 tablet; Refill: 2    6. Circadian rhythm sleep disorder, unspecified type  -     amitriptyline (ELAVIL) 100 MG tablet; Take 1 tablet by mouth every night at bedtime.  Dispense: 30 tablet; Refill: 2  -     traZODone (DESYREL) 150 MG tablet; Take 1 tablet by mouth every night at bedtime.  Dispense: 30  "tablet; Refill: 2         Functional Status: Moderate impairment     Prognosis: Guarded with Ongoing Treatment    Impression/Formulation:  Patient appeared alert and oriented. Patient is receptive to assistance with maintaining a stable lifestyle.  Patient presents with history of     ICD-10-CM ICD-9-CM   1. Bipolar 1 disorder  F31.9 296.7   2. Generalized anxiety disorder  F41.1 300.02   3. Post traumatic stress disorder (PTSD)  F43.10 309.81   4. Attention deficit hyperactivity disorder (ADHD), combined type  F90.2 314.01   5. Obesity (BMI 30-39.9)  E66.9 278.00   6. Circadian rhythm sleep disorder, unspecified type  G47.20 327.30     Patient would like to start weight management and will transfer to new office opening in June.  Insurance will not cover weight management medication other than for pre-diabetes.  UDS:+thc, +oxy-unable to start phentermine at this time.  Patient reports \"getting labs done soon with primary care\"    Treatment Plan:   Continue trazodone, elavil, duloxetine  Increase topiramate for cravings/appetite  Start atomoxetine for adhd   Referred to weight management.    Patient will continue supportive psychotherapy efforts and medications as indicated. Clinic will obtain release of information for current treatment team for continuity of care as needed. Patient will contact this office, call 911 or present to the nearest emergency room should suicidal or homicidal ideations occur.  Discussed medication options and treatment plan of prescribed medication(s) as well as the risks, benefits, and potential side effects. Patient ackowledged and verbally consented to continue with current treatment plan and was educated on the importance of compliance with treatment and follow-up appointments.     Follow Up:   Return in about 3 months (around 7/22/2024) for Med Check, Weight Mgmt.        ANDREA Daniels, PMP-BC [facility]     This is electronically signed by Jesse" ANDREA Ortiz, PMHNP-BC  [unfilled] 13:42 EDT

## 2024-05-14 ENCOUNTER — OFFICE VISIT (OUTPATIENT)
Dept: FAMILY MEDICINE CLINIC | Facility: CLINIC | Age: 49
End: 2024-05-14
Payer: COMMERCIAL

## 2024-05-14 VITALS
BODY MASS INDEX: 38.77 KG/M2 | OXYGEN SATURATION: 99 % | WEIGHT: 247 LBS | DIASTOLIC BLOOD PRESSURE: 66 MMHG | HEIGHT: 67 IN | HEART RATE: 91 BPM | SYSTOLIC BLOOD PRESSURE: 100 MMHG

## 2024-05-14 DIAGNOSIS — M25.521 RIGHT ELBOW PAIN: ICD-10-CM

## 2024-05-14 DIAGNOSIS — I89.0 LYMPHEDEMA OF BOTH LOWER EXTREMITIES: ICD-10-CM

## 2024-05-14 DIAGNOSIS — M80.80XS OTHER OSTEOPOROSIS WITH CURRENT PATHOLOGICAL FRACTURE, SEQUELA: ICD-10-CM

## 2024-05-14 DIAGNOSIS — J30.2 SEASONAL ALLERGIES: ICD-10-CM

## 2024-05-14 DIAGNOSIS — E66.9 OBESITY (BMI 30-39.9): ICD-10-CM

## 2024-05-14 DIAGNOSIS — E56.9 VITAMIN DEFICIENCY: ICD-10-CM

## 2024-05-14 DIAGNOSIS — Z12.31 SCREENING MAMMOGRAM FOR BREAST CANCER: ICD-10-CM

## 2024-05-14 DIAGNOSIS — K59.03 DRUG-INDUCED CONSTIPATION: ICD-10-CM

## 2024-05-14 DIAGNOSIS — F31.9 BIPOLAR 1 DISORDER: ICD-10-CM

## 2024-05-14 DIAGNOSIS — Z00.00 PHYSICAL EXAM: Primary | ICD-10-CM

## 2024-05-14 DIAGNOSIS — Z13.1 SCREENING FOR DIABETES MELLITUS: ICD-10-CM

## 2024-05-14 PROCEDURE — 99396 PREV VISIT EST AGE 40-64: CPT | Performed by: PHYSICIAN ASSISTANT

## 2024-05-14 PROCEDURE — 1159F MED LIST DOCD IN RCRD: CPT | Performed by: PHYSICIAN ASSISTANT

## 2024-05-14 PROCEDURE — 1160F RVW MEDS BY RX/DR IN RCRD: CPT | Performed by: PHYSICIAN ASSISTANT

## 2024-05-14 RX ORDER — ZINC SULFATE 50(220)MG
1 CAPSULE ORAL DAILY
COMMUNITY
Start: 2024-03-04

## 2024-05-14 RX ORDER — FLUTICASONE PROPIONATE 50 MCG
2 SPRAY, SUSPENSION (ML) NASAL DAILY
Qty: 11.1 ML | Refills: 10 | Status: SHIPPED | OUTPATIENT
Start: 2024-05-14

## 2024-05-14 RX ORDER — CETIRIZINE HYDROCHLORIDE 10 MG/1
10 TABLET ORAL DAILY
Qty: 90 TABLET | Refills: 3 | Status: SHIPPED | OUTPATIENT
Start: 2024-05-14

## 2024-05-14 RX ORDER — POLYETHYLENE GLYCOL 3350 17 G/17G
17 POWDER, FOR SOLUTION ORAL DAILY
Qty: 90 EACH | Refills: 2 | Status: SHIPPED | OUTPATIENT
Start: 2024-05-14

## 2024-05-14 RX ORDER — HYDROCHLOROTHIAZIDE 50 MG/1
50 TABLET ORAL DAILY
Qty: 90 TABLET | Refills: 1 | Status: SHIPPED | OUTPATIENT
Start: 2024-05-14

## 2024-05-14 RX ORDER — ASPIRIN 325 MG
2 TABLET ORAL DAILY
Qty: 180 TABLET | Refills: 3 | Status: SHIPPED | OUTPATIENT
Start: 2024-05-14

## 2024-05-14 NOTE — PROGRESS NOTES
"Chief Complaint  Annual Exam    Subjective          Shelley Vo presents to [facility]  History of Present Illness  Patient reports today to for physical exam and fasting labs.       Patient reports having lymphedema since 2017.  Patient states she is currently followed by lymphedema clinic in [place].  Patient reports needing refill of hydrochlorothiazide which she takes as needed for exacerbations of swelling.     Patient reports having chronic pain secondary to lymphedema and arthritis states her pain is well controlled by pain clinic in [place] and she will continue her care there.  Patient reports she is also followed by rheumatology secondary to chronic joint pain.  Patient reports chronic constipation secondary to pain medication and request refills of MiraLAX.       Patient reports having depression, insomnia and anxiety states she takes medication prescribed by psychiatry and feels well.    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  Patient states she is followed by gastroenterology and will continue follow-up.     Patient reports having gastric bypass and has had recent weight gain.  Patient reports she has been provided with a referral to weight management.        Patient reports having seasonal allergies and requests a refill of her medications states is working well.    Patient reports having osteoporosis states she recently broke her toe.  Reports she has not yet followed up with orthopedics.  States she has been buddy taping her toes and pain has subsided.  Patient reports she is due for repeat bone density scan would like to have this scheduled           Objective   Vital Signs:   /66   Pulse 91   Ht 170.2 cm (67\")   Wt 112 kg (247 lb)   SpO2 99%   BMI 38.69 kg/m²     Body mass index is 38.69 kg/m².    Review of Systems    Health Maintenance   Topic Date Due    DXA SCAN  Never done    HEPATITIS C SCREENING  " Never done    ANNUAL PHYSICAL  Never done    MAMMOGRAM  2019    COVID-19 Vaccine (2023-24 season) Never done    INFLUENZA VACCINE  2024    BMI FOLLOWUP  2025    PAP SMEAR  2026    COLORECTAL CANCER SCREENING  2027    TDAP/TD VACCINES (3 - Td or Tdap) 2027    Pneumococcal Vaccine 0-64  Aged Out        Past History:  Medical History: has a past medical history of Parathyroid adenoma (2018), Stricture of bile duct (2023), Thyroid nodule (10/24/2018), and Vitamin D deficiency (2017).   Surgical History: has a past surgical history that includes Gallbladder surgery; Gastric bypass; Abdominal surgery; Bowel resection; and  section.   Family History: family history is not on file.   Social History: reports that she has never smoked. She has never used smokeless tobacco. She reports that she does not currently use alcohol. She reports that she does not use drugs.      Current Outpatient Medications:     amitriptyline (ELAVIL) 100 MG tablet, Take 1 tablet by mouth every night at bedtime., Disp: 30 tablet, Rfl: 2    atomoxetine (STRATTERA) 25 MG capsule, Take 1 capsule by mouth Daily., Disp: 30 capsule, Rfl: 2    cetirizine (zyrTEC) 10 MG tablet, Take 1 tablet by mouth Daily. For allergies, Disp: 90 tablet, Rfl: 3    DULoxetine (CYMBALTA) 60 MG capsule, Take 2 capsules by mouth once daily., Disp: 60 capsule, Rfl: 2    fluticasone (FLONASE) 50 MCG/ACT nasal spray, 2 sprays by Each Nare route Daily. For allergies, Disp: 11.1 mL, Rfl: 10    hydroCHLOROthiazide 50 MG tablet, Take 1 tablet by mouth Daily. As needed for swelling, Disp: 90 tablet, Rfl: 1    oxyCODONE-acetaminophen (PERCOCET)  MG per tablet, Take 1 tablet by mouth 4 (Four) Times a Day., Disp: , Rfl:     polyethylene glycol (MIRALAX) 17 g packet, Take 17 g by mouth Daily. For constipation, Disp: 90 each, Rfl: 2    topiramate (TOPAMAX) 50 MG tablet, Take 1 tablet by mouth Every Night., Disp: 30  tablet, Rfl: 2    traZODone (DESYREL) 150 MG tablet, Take 1 tablet by mouth every night at bedtime., Disp: 30 tablet, Rfl: 2    VITAMIN C, CALCIUM ASCORBATE, PO, Take  by mouth., Disp: , Rfl:     zinc sulfate (ZINCATE) 220 (50 Zn) MG capsule, Take 1 capsule by mouth Daily., Disp: , Rfl:     clotrimazole-betamethasone (LOTRISONE) 1-0.05 % cream, Apply 1 application topically to the appropriate area as directed 2 (Two) Times a Day. (Patient not taking: Reported on 4/22/2024), Disp: 30 g, Rfl: 5    Multiple Vitamins-Minerals (Multivitamin Gummies Adult) chewable tablet, Chew 2 each Daily., Disp: 180 tablet, Rfl: 3    Allergies: Levofloxacin, Nsaids, and Adhesive tape    Physical Exam  Vitals and nursing note reviewed.   Constitutional:       General: She is not in acute distress.     Appearance: She is obese. She is not ill-appearing.   HENT:      Head: Normocephalic.      Right Ear: Tympanic membrane, ear canal and external ear normal.      Left Ear: Tympanic membrane, ear canal and external ear normal.      Nose: Nose normal.      Mouth/Throat:      Mouth: Mucous membranes are moist.   Eyes:      Pupils: Pupils are equal, round, and reactive to light.   Cardiovascular:      Rate and Rhythm: Normal rate and regular rhythm.      Pulses: Normal pulses.   Pulmonary:      Effort: Pulmonary effort is normal. No respiratory distress.   Abdominal:      General: Bowel sounds are normal.      Palpations: Abdomen is soft.      Tenderness: There is no abdominal tenderness. There is no guarding or rebound.   Musculoskeletal:         General: Normal range of motion.      Cervical back: Normal range of motion.      Right lower leg: No edema.      Left lower leg: No edema.   Skin:     General: Skin is warm and dry.      Capillary Refill: Capillary refill takes less than 2 seconds.   Neurological:      General: No focal deficit present.      Mental Status: She is oriented to person, place, and time.   Psychiatric:         Mood and  Affect: Mood normal.          Result Review :                   Assessment and Plan    Diagnoses and all orders for this visit:    1. Physical exam (Primary)  Assessment & Plan:  Discussed injury prevention, diet and exercise, safe sexual practices, and screening for common diseases. Encouraged use of sunscreen and seatbelts. Encouraged SBE, avoidance of tobacco, limiting alcohol, and yearly dental and eye exams.      Orders:  -     CBC & Differential; Future  -     Comprehensive Metabolic Panel; Future  -     TSH; Future  -     Lipid Panel; Future  -     CBC & Differential  -     Comprehensive Metabolic Panel  -     TSH  -     Lipid Panel    2. Seasonal allergies  Assessment & Plan:  Will continue current treatment plan with Zyrtec and Flonase.    Orders:  -     cetirizine (zyrTEC) 10 MG tablet; Take 1 tablet by mouth Daily. For allergies  Dispense: 90 tablet; Refill: 3  -     fluticasone (FLONASE) 50 MCG/ACT nasal spray; 2 sprays by Each Nare route Daily. For allergies  Dispense: 11.1 mL; Refill: 10    3. Drug-induced constipation  Assessment & Plan:  Patient will continue current treatment plan with MiraLAX    Orders:  -     polyethylene glycol (MIRALAX) 17 g packet; Take 17 g by mouth Daily. For constipation  Dispense: 90 each; Refill: 2    4. Bipolar 1 disorder  Assessment & Plan:  Patient will continue current treatment plan with psych      5. Screening for diabetes mellitus  Assessment & Plan:  Hemoglobin A1c ordered this date.    Orders:  -     Hemoglobin A1c; Future  -     Hemoglobin A1c    6. Lymphedema of both lower extremities  Assessment & Plan:  Refill patient's hydrochlorothiazide to take as needed.  Patient's lymphedema is stable at this time    Orders:  -     hydroCHLOROthiazide 50 MG tablet; Take 1 tablet by mouth Daily. As needed for swelling  Dispense: 90 tablet; Refill: 1    7. Vitamin deficiency  Assessment & Plan:  Patient prescribed multivitamin    Orders:  -     Multiple Vitamins-Minerals  (Multivitamin Gummies Adult) chewable tablet; Chew 2 each Daily.  Dispense: 180 tablet; Refill: 3  -     Vitamin B12; Future  -     Vitamin D,25-Hydroxy; Future    8. Obesity (BMI 30-39.9)  Assessment & Plan:  Patient's (Body mass index is 38.69 kg/m².) indicates that they are morbidly/severely obese (BMI > 40 or > 35 with obesity - related health condition) with health conditions that include lower extremity venous stasis disease . Weight is unchanged. BMI  is above average; BMI management plan is completed. We discussed low calorie, low carb based diet program, portion control, and increasing exercise.  Patient has a referral to follow-up with weight management program.      9. Right elbow pain  Assessment & Plan:  Advised patient this is likely tendinitis.  Recommended she continue anti-inflammatories ice and rest.  Will however get x-ray this date per patient request.    Orders:  -     XR Elbow 2 View Right (In Office)    10. Other osteoporosis with current pathological fracture, sequela  Assessment & Plan:  Will order bone density scan    Orders:  -     DEXA Bone Density Axial; Future    11. Screening mammogram for breast cancer  Assessment & Plan:  Order for screening mammogram placed today    Orders:  -     Mammo Screening Digital Tomosynthesis Bilateral With CAD; Future        Follow Up   Return in about 6 months (around 11/14/2024) for Recheck.  Patient was given instructions and counseling regarding her condition or for health maintenance advice. Please see specific information pulled into the AVS if appropriate.     Tracie Nettles PA-C

## 2024-05-14 NOTE — ASSESSMENT & PLAN NOTE
Patient's (Body mass index is 38.69 kg/m².) indicates that they are morbidly/severely obese (BMI > 40 or > 35 with obesity - related health condition) with health conditions that include lower extremity venous stasis disease . Weight is unchanged. BMI  is above average; BMI management plan is completed. We discussed low calorie, low carb based diet program, portion control, and increasing exercise.  Patient has a referral to follow-up with weight management program.

## 2024-05-14 NOTE — ASSESSMENT & PLAN NOTE
Advised patient this is likely tendinitis.  Recommended she continue anti-inflammatories ice and rest.  Will however get x-ray this date per patient request.

## 2024-05-14 NOTE — ASSESSMENT & PLAN NOTE
Refill patient's hydrochlorothiazide to take as needed.  Patient's lymphedema is stable at this time

## 2024-05-16 LAB
ALBUMIN SERPL-MCNC: 3.9 G/DL (ref 3.9–4.9)
ALBUMIN/GLOB SERPL: 1.4 {RATIO} (ref 1.2–2.2)
ALP SERPL-CCNC: 157 IU/L (ref 44–121)
ALT SERPL-CCNC: 15 IU/L (ref 0–32)
AST SERPL-CCNC: 25 IU/L (ref 0–40)
BASOPHILS # BLD AUTO: 0 X10E3/UL (ref 0–0.2)
BASOPHILS NFR BLD AUTO: 0 %
BILIRUB SERPL-MCNC: <0.2 MG/DL (ref 0–1.2)
BUN SERPL-MCNC: 18 MG/DL (ref 6–24)
BUN/CREAT SERPL: 25 (ref 9–23)
CALCIUM SERPL-MCNC: 9.1 MG/DL (ref 8.7–10.2)
CHLORIDE SERPL-SCNC: 106 MMOL/L (ref 96–106)
CHOLEST SERPL-MCNC: 168 MG/DL (ref 100–199)
CO2 SERPL-SCNC: 22 MMOL/L (ref 20–29)
CREAT SERPL-MCNC: 0.73 MG/DL (ref 0.57–1)
EGFRCR SERPLBLD CKD-EPI 2021: 102 ML/MIN/1.73
EOSINOPHIL # BLD AUTO: 0 X10E3/UL (ref 0–0.4)
EOSINOPHIL NFR BLD AUTO: 1 %
ERYTHROCYTE [DISTWIDTH] IN BLOOD BY AUTOMATED COUNT: 14.4 % (ref 11.7–15.4)
GLOBULIN SER CALC-MCNC: 2.8 G/DL (ref 1.5–4.5)
GLUCOSE SERPL-MCNC: 79 MG/DL (ref 70–99)
HBA1C MFR BLD: 6 % (ref 4.8–5.6)
HCT VFR BLD AUTO: 32.1 % (ref 34–46.6)
HDLC SERPL-MCNC: 70 MG/DL
HGB BLD-MCNC: 9.5 G/DL (ref 11.1–15.9)
IMM GRANULOCYTES # BLD AUTO: 0 X10E3/UL (ref 0–0.1)
IMM GRANULOCYTES NFR BLD AUTO: 0 %
LDLC SERPL CALC-MCNC: 83 MG/DL (ref 0–99)
LYMPHOCYTES # BLD AUTO: 1.5 X10E3/UL (ref 0.7–3.1)
LYMPHOCYTES NFR BLD AUTO: 29 %
MCH RBC QN AUTO: 26.7 PG (ref 26.6–33)
MCHC RBC AUTO-ENTMCNC: 29.6 G/DL (ref 31.5–35.7)
MCV RBC AUTO: 90 FL (ref 79–97)
MONOCYTES # BLD AUTO: 0.4 X10E3/UL (ref 0.1–0.9)
MONOCYTES NFR BLD AUTO: 8 %
NEUTROPHILS # BLD AUTO: 3.3 X10E3/UL (ref 1.4–7)
NEUTROPHILS NFR BLD AUTO: 62 %
PLATELET # BLD AUTO: 381 X10E3/UL (ref 150–450)
POTASSIUM SERPL-SCNC: 4.6 MMOL/L (ref 3.5–5.2)
PROT SERPL-MCNC: 6.7 G/DL (ref 6–8.5)
RBC # BLD AUTO: 3.56 X10E6/UL (ref 3.77–5.28)
SODIUM SERPL-SCNC: 140 MMOL/L (ref 134–144)
TRIGL SERPL-MCNC: 79 MG/DL (ref 0–149)
VLDLC SERPL CALC-MCNC: 15 MG/DL (ref 5–40)
WBC # BLD AUTO: 5.3 X10E3/UL (ref 3.4–10.8)

## 2024-05-21 DIAGNOSIS — D64.9 ANEMIA, UNSPECIFIED TYPE: Primary | ICD-10-CM

## 2024-05-30 DIAGNOSIS — D64.9 ANEMIA, UNSPECIFIED TYPE: ICD-10-CM

## 2024-05-30 DIAGNOSIS — K21.9 GASTROESOPHAGEAL REFLUX DISEASE WITHOUT ESOPHAGITIS: Primary | ICD-10-CM

## 2024-05-30 DIAGNOSIS — K59.03 DRUG-INDUCED CONSTIPATION: ICD-10-CM

## 2024-05-30 RX ORDER — OMEPRAZOLE 40 MG/1
40 CAPSULE, DELAYED RELEASE ORAL DAILY
Qty: 90 CAPSULE | Refills: 1 | Status: SHIPPED | OUTPATIENT
Start: 2024-05-30

## 2024-06-20 ENCOUNTER — TELEPHONE (OUTPATIENT)
Dept: FAMILY MEDICINE CLINIC | Facility: CLINIC | Age: 49
End: 2024-06-20
Payer: COMMERCIAL

## 2024-06-25 ENCOUNTER — LAB (OUTPATIENT)
Dept: FAMILY MEDICINE CLINIC | Facility: CLINIC | Age: 49
End: 2024-06-25
Payer: COMMERCIAL

## 2024-06-25 DIAGNOSIS — D64.9 ANEMIA, UNSPECIFIED TYPE: ICD-10-CM

## 2024-06-25 DIAGNOSIS — E56.9 VITAMIN DEFICIENCY: ICD-10-CM

## 2024-06-25 PROCEDURE — 36415 COLL VENOUS BLD VENIPUNCTURE: CPT | Performed by: PHYSICIAN ASSISTANT

## 2024-06-26 LAB
25(OH)D3+25(OH)D2 SERPL-MCNC: 36.2 NG/ML (ref 30–100)
FERRITIN SERPL-MCNC: 20 NG/ML (ref 15–150)
FOLATE SERPL-MCNC: 15.5 NG/ML
IRON SERPL-MCNC: 57 UG/DL (ref 27–159)
VIT B12 SERPL-MCNC: 311 PG/ML (ref 232–1245)

## 2024-07-09 ENCOUNTER — OFFICE VISIT (OUTPATIENT)
Dept: BEHAVIORAL HEALTH | Facility: CLINIC | Age: 49
End: 2024-07-09
Payer: COMMERCIAL

## 2024-07-09 VITALS
BODY MASS INDEX: 37.2 KG/M2 | HEART RATE: 99 BPM | DIASTOLIC BLOOD PRESSURE: 74 MMHG | WEIGHT: 237 LBS | SYSTOLIC BLOOD PRESSURE: 116 MMHG | OXYGEN SATURATION: 98 % | HEIGHT: 67 IN

## 2024-07-09 DIAGNOSIS — E66.9 OBESITY (BMI 30-39.9): ICD-10-CM

## 2024-07-09 DIAGNOSIS — F41.1 GENERALIZED ANXIETY DISORDER: ICD-10-CM

## 2024-07-09 DIAGNOSIS — M80.80XS OTHER OSTEOPOROSIS WITH CURRENT PATHOLOGICAL FRACTURE, SEQUELA: Primary | ICD-10-CM

## 2024-07-09 DIAGNOSIS — F31.9 BIPOLAR 1 DISORDER: ICD-10-CM

## 2024-07-09 DIAGNOSIS — G47.20 CIRCADIAN RHYTHM SLEEP DISORDER, UNSPECIFIED TYPE: ICD-10-CM

## 2024-07-09 DIAGNOSIS — F90.2 ATTENTION DEFICIT HYPERACTIVITY DISORDER (ADHD), COMBINED TYPE: Primary | ICD-10-CM

## 2024-07-09 DIAGNOSIS — F43.10 POST TRAUMATIC STRESS DISORDER (PTSD): ICD-10-CM

## 2024-07-09 PROCEDURE — 99214 OFFICE O/P EST MOD 30 MIN: CPT | Performed by: NURSE PRACTITIONER

## 2024-07-09 RX ORDER — AMITRIPTYLINE HYDROCHLORIDE 100 MG/1
100 TABLET ORAL
Qty: 30 TABLET | Refills: 2 | Status: SHIPPED | OUTPATIENT
Start: 2024-07-09

## 2024-07-09 RX ORDER — DULOXETIN HYDROCHLORIDE 60 MG/1
CAPSULE, DELAYED RELEASE ORAL
Qty: 60 CAPSULE | Refills: 2 | Status: SHIPPED | OUTPATIENT
Start: 2024-07-09

## 2024-07-09 RX ORDER — ATOMOXETINE 40 MG/1
40 CAPSULE ORAL DAILY
Qty: 30 CAPSULE | Refills: 2 | Status: SHIPPED | OUTPATIENT
Start: 2024-07-09

## 2024-07-09 RX ORDER — TOPIRAMATE 50 MG/1
50 TABLET, FILM COATED ORAL NIGHTLY
Qty: 30 TABLET | Refills: 2 | Status: SHIPPED | OUTPATIENT
Start: 2024-07-09

## 2024-07-09 RX ORDER — TRAZODONE HYDROCHLORIDE 150 MG/1
150 TABLET ORAL
Qty: 30 TABLET | Refills: 2 | Status: SHIPPED | OUTPATIENT
Start: 2024-07-09

## 2024-07-09 RX ORDER — ALENDRONATE SODIUM 70 MG/1
70 TABLET ORAL
Qty: 12 TABLET | Refills: 4 | Status: SHIPPED | OUTPATIENT
Start: 2024-07-09

## 2024-07-09 NOTE — PROGRESS NOTES
Follow Up Office Visit      Patient Name: Shelley Vo  : 1975   MRN: [id number]     Referring Provider: Tracie Nettles PA-C    Chief Complaint:      ICD-10-CM ICD-9-CM   1. Attention deficit hyperactivity disorder (ADHD), combined type  F90.2 314.01   2. Bipolar 1 disorder  F31.9 296.7   3. Generalized anxiety disorder  F41.1 300.02   4. Obesity (BMI 30-39.9)  E66.9 278.00   5. Post traumatic stress disorder (PTSD)  F43.10 309.81   6. Circadian rhythm sleep disorder, unspecified type  G47.20 327.30        History of Present Illness:   Shelley Vo is a 48 y.o. female who is here today for follow up with psychiatric medications    Subjective      Patient Reports:   The adhd medicine is not really working  Still struggle with doing things and getting things done.  Low energy.  Could I repeat my drug screen so I can try a different adhd medication?  I haven't had a THC gummy since last year.  I think everything else works great together  I am sleeping good also.      Review of Systems:   Review of Systems   Psychiatric/Behavioral:  Positive for depressed mood and stress. The patient is nervous/anxious.        Screening Scores:   PHQ-9 : 21  CARMEN-7 : 13    RISK ASSESSMENT:  Patient denies any high risk factors today.    Medications:     Current Outpatient Medications:     amitriptyline (ELAVIL) 100 MG tablet, Take 1 tablet by mouth every night at bedtime., Disp: 30 tablet, Rfl: 2    cetirizine (zyrTEC) 10 MG tablet, Take 1 tablet by mouth Daily. For allergies, Disp: 90 tablet, Rfl: 3    DULoxetine (CYMBALTA) 60 MG capsule, Take 2 capsules by mouth once daily., Disp: 60 capsule, Rfl: 2    fluticasone (FLONASE) 50 MCG/ACT nasal spray, 2 sprays by Each Nare route Daily. For allergies, Disp: 11.1 mL, Rfl: 10    hydroCHLOROthiazide 50 MG tablet, Take 1 tablet by mouth Daily. As needed for swelling, Disp: 90 tablet, Rfl: 1    Multiple Vitamins-Minerals (Multivitamin Gummies Adult) chewable tablet, Chew 2 each  "Daily., Disp: 180 tablet, Rfl: 3    omeprazole (priLOSEC) 40 MG capsule, Take 1 capsule by mouth Daily. For acid reflux, Disp: 90 capsule, Rfl: 1    oxyCODONE-acetaminophen (PERCOCET)  MG per tablet, Take 1 tablet by mouth 4 (Four) Times a Day., Disp: , Rfl:     polyethylene glycol (MIRALAX) 17 g packet, Take 17 g by mouth Daily. For constipation, Disp: 90 each, Rfl: 2    topiramate (TOPAMAX) 50 MG tablet, Take 1 tablet by mouth Every Night., Disp: 30 tablet, Rfl: 2    traZODone (DESYREL) 150 MG tablet, Take 1 tablet by mouth every night at bedtime., Disp: 30 tablet, Rfl: 2    VITAMIN C, CALCIUM ASCORBATE, PO, Take  by mouth., Disp: , Rfl:     atomoxetine (STRATTERA) 40 MG capsule, Take 1 capsule by mouth Daily., Disp: 30 capsule, Rfl: 2    zinc sulfate (ZINCATE) 220 (50 Zn) MG capsule, Take 1 capsule by mouth Daily., Disp: , Rfl:     Medication Considerations:  EWA reviewed and appropriate.      Allergies:   Allergies   Allergen Reactions    Levofloxacin Unknown - Low Severity     Other reaction(s): joints hurt post taking    Nsaids Other (See Comments) and Myalgia     Hx ulcers    Other reaction(s): Other (See Comments)    Pt states upset stomach    contraindicated due to stomach ulcersPt states upset stomach    Adhesive Tape Rash     Surgical tape - bruising and rash  Surgical tape - bruising and rash         Results Reviewed:   screeners     Objective     Physical Exam:  Vital Signs:   Vitals:    07/09/24 1129   BP: 116/74   Pulse: 99   SpO2: 98%   Weight: 108 kg (237 lb)   Height: 170.2 cm (67\")     Body mass index is 37.12 kg/m².     Mental Status Exam:   Hygiene:   good  Cooperation:  Cooperative  Eye Contact:  Good  Psychomotor Behavior:  Restless  Affect:  Appropriate  Mood: anxious  Speech:  Normal  Thought Process:  Goal directed and Linear  Thought Content:  Mood congruent  Suicidal:  None  Homicidal:  None  Hallucinations:  None  Delusion:  None  Memory:  Intact  Orientation:  Grossly " intact  Reliability:  fair  Insight:  Fair  Judgement:  Fair  Impulse Control:  Fair  Physical/Medical Issues:   nothing reported today      Assessment / Plan      Visit Diagnosis/Orders Placed This Visit:  Diagnoses and all orders for this visit:    1. Attention deficit hyperactivity disorder (ADHD), combined type (Primary)  -     POC Urine Drug Screen, Triage  -     atomoxetine (STRATTERA) 40 MG capsule; Take 1 capsule by mouth Daily.  Dispense: 30 capsule; Refill: 2    2. Bipolar 1 disorder  -     topiramate (TOPAMAX) 50 MG tablet; Take 1 tablet by mouth Every Night.  Dispense: 30 tablet; Refill: 2  -     DULoxetine (CYMBALTA) 60 MG capsule; Take 2 capsules by mouth once daily.  Dispense: 60 capsule; Refill: 2    3. Generalized anxiety disorder  -     topiramate (TOPAMAX) 50 MG tablet; Take 1 tablet by mouth Every Night.  Dispense: 30 tablet; Refill: 2  -     traZODone (DESYREL) 150 MG tablet; Take 1 tablet by mouth every night at bedtime.  Dispense: 30 tablet; Refill: 2  -     amitriptyline (ELAVIL) 100 MG tablet; Take 1 tablet by mouth every night at bedtime.  Dispense: 30 tablet; Refill: 2  -     DULoxetine (CYMBALTA) 60 MG capsule; Take 2 capsules by mouth once daily.  Dispense: 60 capsule; Refill: 2    4. Obesity (BMI 30-39.9)  -     topiramate (TOPAMAX) 50 MG tablet; Take 1 tablet by mouth Every Night.  Dispense: 30 tablet; Refill: 2    5. Post traumatic stress disorder (PTSD)  -     traZODone (DESYREL) 150 MG tablet; Take 1 tablet by mouth every night at bedtime.  Dispense: 30 tablet; Refill: 2    6. Circadian rhythm sleep disorder, unspecified type  -     traZODone (DESYREL) 150 MG tablet; Take 1 tablet by mouth every night at bedtime.  Dispense: 30 tablet; Refill: 2  -     amitriptyline (ELAVIL) 100 MG tablet; Take 1 tablet by mouth every night at bedtime.  Dispense: 30 tablet; Refill: 2         Functional Status: Moderate impairment     Prognosis: Guarded with Ongoing  Treatment    Impression/Formulation:  Patient appeared alert and oriented. Patient is receptive to assistance with maintaining a stable lifestyle.  Patient presents with history of     ICD-10-CM ICD-9-CM   1. Attention deficit hyperactivity disorder (ADHD), combined type  F90.2 314.01   2. Bipolar 1 disorder  F31.9 296.7   3. Generalized anxiety disorder  F41.1 300.02   4. Obesity (BMI 30-39.9)  E66.9 278.00   5. Post traumatic stress disorder (PTSD)  F43.10 309.81   6. Circadian rhythm sleep disorder, unspecified type  G47.20 327.30   Patient screened positive for depression based on a PHQ-9 score of 21 on 7/9/2024. Follow-up recommendations include:  continue current antidepressant medications, encouraged therapy, .  Weight and adhd symptoms may be contributing to depression/anxiety.  Patient plans to start weight management program. Packet provided to patient.  Patient has been able to lose 10 pounds since the addition of topiramate.  Has eliminated nighttime eating.  UDS ordered for adhd medication options, patient positive for THC, Oxy, unable to start stimulant medication at this time.      Treatment Plan:   Continue topiramate, trazodone, amitriptyline, duloxetine  Increase atomoxetine  Encouraged therapy    Patient will continue supportive psychotherapy efforts and medications as indicated. Clinic will obtain release of information for current treatment team for continuity of care as needed. Patient will contact this office, call 911 or present to the nearest emergency room should suicidal or homicidal ideations occur.  Discussed medication options and treatment plan of prescribed medication(s) as well as the risks, benefits, and potential side effects. Patient ackowledged and verbally consented to continue with current treatment plan and was educated on the importance of compliance with treatment and follow-up appointments.     Follow Up:   No follow-ups on file.        ANDREA Daniels,  PMHNP-BC  [facility]     This is electronically signed by ANDREA Daniels, PMHNP-BC  [unfilled] 13:09 EDT

## 2024-07-25 DIAGNOSIS — F90.2 ATTENTION DEFICIT HYPERACTIVITY DISORDER (ADHD), COMBINED TYPE: ICD-10-CM

## 2024-07-25 RX ORDER — ATOMOXETINE 25 MG/1
25 CAPSULE ORAL DAILY
Qty: 30 CAPSULE | Refills: 2 | OUTPATIENT
Start: 2024-07-25

## 2024-08-01 DIAGNOSIS — J30.2 SEASONAL ALLERGIES: ICD-10-CM

## 2024-08-01 RX ORDER — CETIRIZINE HYDROCHLORIDE 10 MG/1
10 TABLET ORAL DAILY
Qty: 90 TABLET | Refills: 3 | Status: SHIPPED | OUTPATIENT
Start: 2024-08-01

## 2024-08-01 NOTE — TELEPHONE ENCOUNTER
Caller: Shelley Vo    Relationship: Self    Best call back number: [phone]    Requested Prescriptions:   Requested Prescriptions     Pending Prescriptions Disp Refills    cetirizine (zyrTEC) 10 MG tablet 90 tablet 3     Sig: Take 1 tablet by mouth Daily. For allergies        Pharmacy where request should be sent:    [facility] [phone]  Last office visit with prescribing clinician: 5/14/2024   Last telemedicine visit with prescribing clinician: Visit date not found   Next office visit with prescribing clinician: Visit date not found     Additional details provided by patient: PATIENT IS OUT OF MEDICATION    Does the patient have less than a 3 day supply:  [x] Yes  [] No    Would you like a call back once the refill request has been completed: [] Yes [x] No    If the office needs to give you a call back, can they leave a voicemail: [] Yes [x] No    Delvis Wright Rep   08/01/24 14:03 EDT        called [facility] ems for transport back to [facility]. [facility] will  with next available.

## 2024-08-20 DIAGNOSIS — Z98.84 STATUS POST GASTRIC BYPASS FOR OBESITY: Primary | ICD-10-CM

## 2024-08-20 DIAGNOSIS — Z86.39 HISTORY OF IRON DEFICIENCY: ICD-10-CM

## 2024-08-20 DIAGNOSIS — D64.9 ANEMIA, UNSPECIFIED TYPE: ICD-10-CM

## 2024-08-28 DIAGNOSIS — Z86.39 HISTORY OF IRON DEFICIENCY: Primary | ICD-10-CM

## 2024-10-08 DIAGNOSIS — F41.1 GENERALIZED ANXIETY DISORDER: ICD-10-CM

## 2024-10-08 DIAGNOSIS — E66.9 OBESITY (BMI 30-39.9): ICD-10-CM

## 2024-10-08 DIAGNOSIS — F31.9 BIPOLAR 1 DISORDER: ICD-10-CM

## 2024-10-08 RX ORDER — TOPIRAMATE 50 MG/1
50 TABLET, FILM COATED ORAL NIGHTLY
Qty: 30 TABLET | Refills: 0 | OUTPATIENT
Start: 2024-10-08

## 2024-10-08 NOTE — TELEPHONE ENCOUNTER
Rx Refill Note    Requested Prescriptions     Pending Prescriptions Disp Refills    topiramate (TOPAMAX) 50 MG tablet [Pharmacy Med Name: TOPIRAMATE 50MG TABLETS] 30 tablet 0     Sig: TAKE 1 TABLET BY MOUTH EVERY NIGHT        Last office visit with prescribing clinician: 7/9/2024      Next office visit with prescribing clinician: 10/9/2024   Last labs:   Last refill: 07/09/2024   Pharmacy (be sure to add in Epic). correct

## 2024-10-09 ENCOUNTER — OFFICE VISIT (OUTPATIENT)
Dept: BEHAVIORAL HEALTH | Facility: CLINIC | Age: 49
End: 2024-10-09
Payer: COMMERCIAL

## 2024-10-09 VITALS
HEIGHT: 67 IN | SYSTOLIC BLOOD PRESSURE: 110 MMHG | HEART RATE: 98 BPM | OXYGEN SATURATION: 97 % | BODY MASS INDEX: 38.2 KG/M2 | DIASTOLIC BLOOD PRESSURE: 80 MMHG | WEIGHT: 243.4 LBS

## 2024-10-09 DIAGNOSIS — F41.1 GENERALIZED ANXIETY DISORDER: ICD-10-CM

## 2024-10-09 DIAGNOSIS — F31.9 BIPOLAR 1 DISORDER: ICD-10-CM

## 2024-10-09 DIAGNOSIS — F90.2 ATTENTION DEFICIT HYPERACTIVITY DISORDER (ADHD), COMBINED TYPE: Primary | ICD-10-CM

## 2024-10-09 DIAGNOSIS — E66.9 OBESITY (BMI 30-39.9): ICD-10-CM

## 2024-10-09 DIAGNOSIS — G47.20 CIRCADIAN RHYTHM SLEEP DISORDER, UNSPECIFIED TYPE: ICD-10-CM

## 2024-10-09 DIAGNOSIS — F43.10 POST TRAUMATIC STRESS DISORDER (PTSD): ICD-10-CM

## 2024-10-09 LAB
BUPRENORPHINE SERPL-MCNC: NEGATIVE NG/ML
MDMA SERPLBLD-MCNC: NEGATIVE NG/ML
PCP: NEGATIVE
POC AMPHETAMINES: NEGATIVE
POC BARBITURATES: NEGATIVE
POC BENZODIAZEPHINES: NEGATIVE
POC COCAINE: NEGATIVE
POC METHADONE: NEGATIVE
POC METHAMPHETAMINE SCREEN URINE: NEGATIVE
POC OPIATES: POSITIVE
POC OXYCODONE: POSITIVE
POC THC: NEGATIVE

## 2024-10-09 RX ORDER — TRAZODONE HYDROCHLORIDE 150 MG/1
150 TABLET ORAL
Qty: 30 TABLET | Refills: 2 | Status: SHIPPED | OUTPATIENT
Start: 2024-10-09

## 2024-10-09 RX ORDER — DULOXETIN HYDROCHLORIDE 60 MG/1
CAPSULE, DELAYED RELEASE ORAL
Qty: 60 CAPSULE | Refills: 2 | Status: SHIPPED | OUTPATIENT
Start: 2024-10-09

## 2024-10-09 RX ORDER — AMITRIPTYLINE HYDROCHLORIDE 100 MG/1
100 TABLET ORAL
Qty: 30 TABLET | Refills: 2 | Status: SHIPPED | OUTPATIENT
Start: 2024-10-09

## 2024-10-09 RX ORDER — TOPIRAMATE 50 MG/1
50 TABLET, FILM COATED ORAL NIGHTLY
Qty: 30 TABLET | Refills: 2 | Status: SHIPPED | OUTPATIENT
Start: 2024-10-09

## 2024-10-09 NOTE — PROGRESS NOTES
Follow Up Office Visit      Patient Name: Shelley Vo  : 1975   MRN: [id number]     Referring Provider: Tracie Nettles PA-C    Chief Complaint:      ICD-10-CM ICD-9-CM   1. Attention deficit hyperactivity disorder (ADHD), combined type  F90.2 314.01   2. Bipolar 1 disorder  F31.9 296.7   3. Generalized anxiety disorder  F41.1 300.02   4. Circadian rhythm sleep disorder, unspecified type  G47.20 327.30   5. Obesity (BMI 30-39.9)  E66.9 278.00   6. Post traumatic stress disorder (PTSD)  F43.10 309.81        History of Present Illness:   Shelley Vo is a 48 y.o. female who is here today for follow up with psychiatric medications.    Subjective      Patient Reports:   Strattera is not doing anything for me  Tried vyvanse and wasn't good with the wellbutrin xl.  Tried concerta too but I don't really remember anything about it.  Ended up with adderall xr.  Diagnosed in middle school.  Never stable, losing things all the time.  Taking on too many projects.      Review of Systems:   Review of Systems   Psychiatric/Behavioral:  Positive for decreased concentration, positive for hyperactivity and stress. The patient is nervous/anxious.        Screening Scores:   PHQ-9 : 13  CARMEN-7 : 12    RISK ASSESSMENT:  Patient denies any high risk factors today.    Medications:     Current Outpatient Medications:     alendronate (Fosamax) 70 MG tablet, Take 1 tablet by mouth Every 7 (Seven) Days. For osteopenia, Disp: 12 tablet, Rfl: 4    amitriptyline (ELAVIL) 100 MG tablet, Take 1 tablet by mouth every night at bedtime., Disp: 30 tablet, Rfl: 2    cetirizine (zyrTEC) 10 MG tablet, Take 1 tablet by mouth Daily. For allergies, Disp: 90 tablet, Rfl: 3    DULoxetine (CYMBALTA) 60 MG capsule, Take 2 capsules by mouth once daily., Disp: 60 capsule, Rfl: 2    fluticasone (FLONASE) 50 MCG/ACT nasal spray, 2 sprays by Each Nare route Daily. For allergies, Disp: 11.1 mL, Rfl: 10    hydroCHLOROthiazide 50 MG tablet, Take 1 tablet  "by mouth Daily. As needed for swelling, Disp: 90 tablet, Rfl: 1    Multiple Vitamins-Minerals (Multivitamin Gummies Adult) chewable tablet, Chew 2 each Daily., Disp: 180 tablet, Rfl: 3    omeprazole (priLOSEC) 40 MG capsule, Take 1 capsule by mouth Daily. For acid reflux, Disp: 90 capsule, Rfl: 1    oxyCODONE-acetaminophen (PERCOCET)  MG per tablet, Take 1 tablet by mouth 4 (Four) Times a Day., Disp: , Rfl:     polyethylene glycol (MIRALAX) 17 g packet, Take 17 g by mouth Daily. For constipation, Disp: 90 each, Rfl: 2    topiramate (TOPAMAX) 50 MG tablet, Take 1 tablet by mouth Every Night., Disp: 30 tablet, Rfl: 2    traZODone (DESYREL) 150 MG tablet, Take 1 tablet by mouth every night at bedtime., Disp: 30 tablet, Rfl: 2    VITAMIN C, CALCIUM ASCORBATE, PO, Take  by mouth., Disp: , Rfl:     zinc sulfate (ZINCATE) 220 (50 Zn) MG capsule, Take 1 capsule by mouth Daily., Disp: , Rfl:     Medication Considerations:  EWA reviewed and appropriate.      Allergies:   Allergies   Allergen Reactions    Levofloxacin Unknown - Low Severity     Other reaction(s): joints hurt post taking    Nsaids Other (See Comments) and Myalgia     Hx ulcers    Other reaction(s): Other (See Comments)    Pt states upset stomach    contraindicated due to stomach ulcersPt states upset stomach    Adhesive Tape Rash     Surgical tape - bruising and rash  Surgical tape - bruising and rash         Results Reviewed:   screeners     Objective     Physical Exam:  Vital Signs:   Vitals:    10/09/24 1315   BP: 110/80   Pulse: 98   SpO2: 97%   Weight: 110 kg (243 lb 6.4 oz)   Height: 170.2 cm (67\")     Body mass index is 38.12 kg/m².     Mental Status Exam:   Hygiene:   good  Cooperation:  Cooperative  Eye Contact:  Good  Psychomotor Behavior:  Hyperactive  Affect:  Appropriate  Mood: anxious  Speech:  Rapid  Thought Process:  Goal directed and Linear  Thought Content:  Mood congruent  Suicidal:  None  Homicidal:  None  Hallucinations:  " None  Delusion:  None  Memory:  Deficits  Orientation:  Grossly intact  Reliability:  good  Insight:  Fair  Judgement:  Fair  Impulse Control:  Fair  Physical/Medical Issues:   nothing reported today      Assessment / Plan      Visit Diagnosis/Orders Placed This Visit:  Diagnoses and all orders for this visit:    1. Attention deficit hyperactivity disorder (ADHD), combined type (Primary)  -     POC Urine Drug Screen, Triage    2. Bipolar 1 disorder  -     DULoxetine (CYMBALTA) 60 MG capsule; Take 2 capsules by mouth once daily.  Dispense: 60 capsule; Refill: 2  -     topiramate (TOPAMAX) 50 MG tablet; Take 1 tablet by mouth Every Night.  Dispense: 30 tablet; Refill: 2    3. Generalized anxiety disorder  -     DULoxetine (CYMBALTA) 60 MG capsule; Take 2 capsules by mouth once daily.  Dispense: 60 capsule; Refill: 2  -     amitriptyline (ELAVIL) 100 MG tablet; Take 1 tablet by mouth every night at bedtime.  Dispense: 30 tablet; Refill: 2  -     topiramate (TOPAMAX) 50 MG tablet; Take 1 tablet by mouth Every Night.  Dispense: 30 tablet; Refill: 2  -     traZODone (DESYREL) 150 MG tablet; Take 1 tablet by mouth every night at bedtime.  Dispense: 30 tablet; Refill: 2    4. Circadian rhythm sleep disorder, unspecified type  -     amitriptyline (ELAVIL) 100 MG tablet; Take 1 tablet by mouth every night at bedtime.  Dispense: 30 tablet; Refill: 2  -     traZODone (DESYREL) 150 MG tablet; Take 1 tablet by mouth every night at bedtime.  Dispense: 30 tablet; Refill: 2    5. Obesity (BMI 30-39.9)  -     topiramate (TOPAMAX) 50 MG tablet; Take 1 tablet by mouth Every Night.  Dispense: 30 tablet; Refill: 2    6. Post traumatic stress disorder (PTSD)  -     traZODone (DESYREL) 150 MG tablet; Take 1 tablet by mouth every night at bedtime.  Dispense: 30 tablet; Refill: 2         Functional Status: Moderate impairment     Prognosis: Good with Ongoing Treatment     Impression/Formulation:  Patient appeared alert and oriented. Patient  is receptive to assistance with maintaining a stable lifestyle.  Patient presents with history of     ICD-10-CM ICD-9-CM   1. Attention deficit hyperactivity disorder (ADHD), combined type  F90.2 314.01   2. Bipolar 1 disorder  F31.9 296.7   3. Generalized anxiety disorder  F41.1 300.02   4. Circadian rhythm sleep disorder, unspecified type  G47.20 327.30   5. Obesity (BMI 30-39.9)  E66.9 278.00   6. Post traumatic stress disorder (PTSD)  F43.10 309.81   . Patient screened positive for depression based on a PHQ-9 score of 13 on 10/9/2024. Follow-up recommendations include: Prescribed antidepressant medication treatment.  Patient continues to struggle with adhd symptoms and would like to reinitiate the medication she used to take.  Patient would like to retake her UDS, she reports that she has not had any edible THC since December 2023.    Treatment Plan:   Start adderall xr with appropriate UDS, signed CSA.  Continue trazodone, topiramate, duloxetine, amitriptyline    Patient will continue supportive psychotherapy efforts and medications as indicated. Clinic will obtain release of information for current treatment team for continuity of care as needed. Patient will contact this office, call 911 or present to the nearest emergency room should suicidal or homicidal ideations occur.  Discussed medication options and treatment plan of prescribed medication(s) as well as the risks, benefits, and potential side effects. Patient ackowledged and verbally consented to continue with current treatment plan and was educated on the importance of compliance with treatment and follow-up appointments.     Follow Up:   Return in about 3 months (around 1/9/2025) for Med Check.        ANDREA Daniels, PMCURTP-BC  [facility]     This is electronically signed by ANDREA Daniels, MONIKA  [unfilled] 18:25 EDT

## 2024-10-10 ENCOUNTER — TELEPHONE (OUTPATIENT)
Dept: BEHAVIORAL HEALTH | Facility: CLINIC | Age: 49
End: 2024-10-10
Payer: COMMERCIAL

## 2024-10-10 RX ORDER — DEXTROAMPHETAMINE SACCHARATE, AMPHETAMINE ASPARTATE MONOHYDRATE, DEXTROAMPHETAMINE SULFATE AND AMPHETAMINE SULFATE 5; 5; 5; 5 MG/1; MG/1; MG/1; MG/1
20 CAPSULE, EXTENDED RELEASE ORAL DAILY
Qty: 30 CAPSULE | Refills: 0 | Status: SHIPPED | OUTPATIENT
Start: 2024-10-10 | End: 2025-10-10

## 2024-10-10 NOTE — TELEPHONE ENCOUNTER
PATIENT WAS ASKING ABOUT PRESCRIPTION FOR HER ADHD MEDICATION. STATES SHE COMPLETED HER UDS AT [facility] YESTERDAY

## 2024-10-12 DIAGNOSIS — F90.2 ATTENTION DEFICIT HYPERACTIVITY DISORDER (ADHD), COMBINED TYPE: ICD-10-CM

## 2024-10-14 ENCOUNTER — TELEPHONE (OUTPATIENT)
Dept: BEHAVIORAL HEALTH | Facility: CLINIC | Age: 49
End: 2024-10-14
Payer: COMMERCIAL

## 2024-10-14 RX ORDER — ATOMOXETINE 40 MG/1
40 CAPSULE ORAL DAILY
Qty: 30 CAPSULE | Refills: 2 | OUTPATIENT
Start: 2024-10-14

## 2024-10-14 NOTE — TELEPHONE ENCOUNTER
No insurance wont cover 60 of the XR, ask her to find a pharmacy for me to send it to or she can't just wait for it at her pharmacy.  Thanks

## 2024-10-14 NOTE — TELEPHONE ENCOUNTER
Patient called and stated that her pharmacy is out of Adderall 20mg XR but that they do have Adderall 10mg XR in stock. Patient is wanting to know if you could send in Adderall 10mg XR with a quantity of 60 so she can take two a day?

## 2024-10-15 DIAGNOSIS — F90.2 ATTENTION DEFICIT HYPERACTIVITY DISORDER (ADHD), COMBINED TYPE: ICD-10-CM

## 2024-10-15 RX ORDER — DEXTROAMPHETAMINE SACCHARATE, AMPHETAMINE ASPARTATE MONOHYDRATE, DEXTROAMPHETAMINE SULFATE AND AMPHETAMINE SULFATE 5; 5; 5; 5 MG/1; MG/1; MG/1; MG/1
20 CAPSULE, EXTENDED RELEASE ORAL DAILY
Qty: 30 CAPSULE | Refills: 0 | Status: SHIPPED | OUTPATIENT
Start: 2024-10-15 | End: 2025-10-15

## 2024-10-15 NOTE — TELEPHONE ENCOUNTER
Patient contacted. Message below given. Patient is going to call around to pharmacies and let us know. Will wait for pts call back.

## 2024-10-15 NOTE — TELEPHONE ENCOUNTER
Patient called and requested her Rx be resent to [facility]. They have Adderall 20mg XR in stock.

## 2024-11-08 DIAGNOSIS — F90.2 ATTENTION DEFICIT HYPERACTIVITY DISORDER (ADHD), COMBINED TYPE: ICD-10-CM

## 2024-11-08 NOTE — TELEPHONE ENCOUNTER
Patient called and requested a refill. Patient states that medication is working well for her and has had no side effects but does feel like her dose may need to be increased.

## 2024-11-11 RX ORDER — DEXTROAMPHETAMINE SACCHARATE, AMPHETAMINE ASPARTATE MONOHYDRATE, DEXTROAMPHETAMINE SULFATE AND AMPHETAMINE SULFATE 5; 5; 5; 5 MG/1; MG/1; MG/1; MG/1
20 CAPSULE, EXTENDED RELEASE ORAL DAILY
Qty: 30 CAPSULE | Refills: 0 | Status: SHIPPED | OUTPATIENT
Start: 2024-11-11 | End: 2025-11-11

## 2024-12-06 DIAGNOSIS — F90.2 ATTENTION DEFICIT HYPERACTIVITY DISORDER (ADHD), COMBINED TYPE: ICD-10-CM

## 2024-12-09 RX ORDER — DEXTROAMPHETAMINE SACCHARATE, AMPHETAMINE ASPARTATE MONOHYDRATE, DEXTROAMPHETAMINE SULFATE AND AMPHETAMINE SULFATE 5; 5; 5; 5 MG/1; MG/1; MG/1; MG/1
20 CAPSULE, EXTENDED RELEASE ORAL DAILY
Qty: 30 CAPSULE | Refills: 0 | Status: SHIPPED | OUTPATIENT
Start: 2024-12-09 | End: 2025-12-09

## 2025-01-10 DIAGNOSIS — F31.9 BIPOLAR 1 DISORDER: ICD-10-CM

## 2025-01-10 DIAGNOSIS — F41.1 GENERALIZED ANXIETY DISORDER: ICD-10-CM

## 2025-01-10 DIAGNOSIS — F43.10 POST TRAUMATIC STRESS DISORDER (PTSD): ICD-10-CM

## 2025-01-10 DIAGNOSIS — G47.20 CIRCADIAN RHYTHM SLEEP DISORDER, UNSPECIFIED TYPE: ICD-10-CM

## 2025-01-10 DIAGNOSIS — F90.2 ATTENTION DEFICIT HYPERACTIVITY DISORDER (ADHD), COMBINED TYPE: ICD-10-CM

## 2025-01-10 RX ORDER — AMITRIPTYLINE HYDROCHLORIDE 100 MG/1
100 TABLET ORAL
Qty: 30 TABLET | Refills: 0 | Status: SHIPPED | OUTPATIENT
Start: 2025-01-10

## 2025-01-10 RX ORDER — DEXTROAMPHETAMINE SACCHARATE, AMPHETAMINE ASPARTATE MONOHYDRATE, DEXTROAMPHETAMINE SULFATE AND AMPHETAMINE SULFATE 5; 5; 5; 5 MG/1; MG/1; MG/1; MG/1
20 CAPSULE, EXTENDED RELEASE ORAL DAILY
Qty: 30 CAPSULE | Refills: 0 | Status: SHIPPED | OUTPATIENT
Start: 2025-01-10 | End: 2026-01-10

## 2025-01-10 RX ORDER — DULOXETIN HYDROCHLORIDE 60 MG/1
CAPSULE, DELAYED RELEASE ORAL
Qty: 60 CAPSULE | Refills: 0 | Status: SHIPPED | OUTPATIENT
Start: 2025-01-10

## 2025-01-10 RX ORDER — TRAZODONE HYDROCHLORIDE 150 MG/1
150 TABLET ORAL
Qty: 30 TABLET | Refills: 0 | Status: SHIPPED | OUTPATIENT
Start: 2025-01-10

## 2025-01-27 ENCOUNTER — OFFICE VISIT (OUTPATIENT)
Dept: BEHAVIORAL HEALTH | Facility: CLINIC | Age: 50
End: 2025-01-27
Payer: COMMERCIAL

## 2025-01-27 VITALS
SYSTOLIC BLOOD PRESSURE: 122 MMHG | WEIGHT: 239 LBS | DIASTOLIC BLOOD PRESSURE: 74 MMHG | HEIGHT: 67 IN | HEART RATE: 86 BPM | BODY MASS INDEX: 37.51 KG/M2 | OXYGEN SATURATION: 99 %

## 2025-01-27 DIAGNOSIS — F31.9 BIPOLAR 1 DISORDER: ICD-10-CM

## 2025-01-27 DIAGNOSIS — F90.2 ATTENTION DEFICIT HYPERACTIVITY DISORDER (ADHD), COMBINED TYPE: Primary | ICD-10-CM

## 2025-01-27 DIAGNOSIS — F43.10 POST TRAUMATIC STRESS DISORDER (PTSD): ICD-10-CM

## 2025-01-27 DIAGNOSIS — F41.1 GENERALIZED ANXIETY DISORDER: ICD-10-CM

## 2025-01-27 DIAGNOSIS — G47.20 CIRCADIAN RHYTHM SLEEP DISORDER, UNSPECIFIED TYPE: ICD-10-CM

## 2025-01-27 DIAGNOSIS — E66.9 OBESITY (BMI 30-39.9): ICD-10-CM

## 2025-01-27 PROCEDURE — 1159F MED LIST DOCD IN RCRD: CPT | Performed by: NURSE PRACTITIONER

## 2025-01-27 PROCEDURE — 96127 BRIEF EMOTIONAL/BEHAV ASSMT: CPT | Performed by: NURSE PRACTITIONER

## 2025-01-27 PROCEDURE — 99214 OFFICE O/P EST MOD 30 MIN: CPT | Performed by: NURSE PRACTITIONER

## 2025-01-27 PROCEDURE — 1160F RVW MEDS BY RX/DR IN RCRD: CPT | Performed by: NURSE PRACTITIONER

## 2025-01-27 RX ORDER — AMITRIPTYLINE HYDROCHLORIDE 100 MG/1
100 TABLET ORAL
Qty: 30 TABLET | Refills: 0 | Status: SHIPPED | OUTPATIENT
Start: 2025-01-27

## 2025-01-27 RX ORDER — DULOXETIN HYDROCHLORIDE 60 MG/1
CAPSULE, DELAYED RELEASE ORAL
Qty: 60 CAPSULE | Refills: 0 | Status: SHIPPED | OUTPATIENT
Start: 2025-01-27

## 2025-01-27 RX ORDER — TRAZODONE HYDROCHLORIDE 150 MG/1
150 TABLET ORAL
Qty: 30 TABLET | Refills: 0 | Status: SHIPPED | OUTPATIENT
Start: 2025-01-27

## 2025-01-27 RX ORDER — DEXTROAMPHETAMINE SACCHARATE, AMPHETAMINE ASPARTATE MONOHYDRATE, DEXTROAMPHETAMINE SULFATE AND AMPHETAMINE SULFATE 6.25; 6.25; 6.25; 6.25 MG/1; MG/1; MG/1; MG/1
25 CAPSULE, EXTENDED RELEASE ORAL EVERY MORNING
Qty: 30 CAPSULE | Refills: 0 | Status: SHIPPED | OUTPATIENT
Start: 2025-01-27

## 2025-01-27 RX ORDER — TOPIRAMATE 50 MG/1
50 TABLET, FILM COATED ORAL NIGHTLY
Qty: 30 TABLET | Refills: 2 | Status: SHIPPED | OUTPATIENT
Start: 2025-01-27

## 2025-01-27 NOTE — PROGRESS NOTES
Follow Up Office Visit      Patient Name: Shelley Vo  : 1975   MRN: [id number]     Referring Provider: Tracie Nettles PA-C    Chief Complaint:      ICD-10-CM ICD-9-CM   1. Attention deficit hyperactivity disorder (ADHD), combined type  F90.2 314.01   2. Generalized anxiety disorder  F41.1 300.02   3. Circadian rhythm sleep disorder, unspecified type  G47.20 327.30   4. Bipolar 1 disorder  F31.9 296.7   5. Post traumatic stress disorder (PTSD)  F43.10 309.81   6. Obesity (BMI 30-39.9)  E66.9 278.00        History of Present Illness:   Shelley Vo is a 49 y.o. female who is here today for follow up with psychiatric medications.    Subjective      Patient Reports:   The adderall is helping but I think it needs to be adjusted up a little.  It works but just barely.  Still having trouble with tasks and staying focused.  No issues with sleep.  Started home schooling my kids after winter break.  Found a great online program.    Review of Systems:   Review of Systems   Psychiatric/Behavioral:  Positive for decreased concentration.        Screening Scores:   PHQ-9 : 10  CARMEN-7 : 7    RISK ASSESSMENT:  Patient denies any high risk factors today.    Medications:     Current Outpatient Medications:     alendronate (Fosamax) 70 MG tablet, Take 1 tablet by mouth Every 7 (Seven) Days. For osteopenia, Disp: 12 tablet, Rfl: 4    amitriptyline (ELAVIL) 100 MG tablet, Take 1 tablet by mouth every night at bedtime., Disp: 30 tablet, Rfl: 0    cetirizine (zyrTEC) 10 MG tablet, Take 1 tablet by mouth Daily. For allergies, Disp: 90 tablet, Rfl: 3    DULoxetine (CYMBALTA) 60 MG capsule, Take 2 capsules by mouth once daily., Disp: 60 capsule, Rfl: 0    fluticasone (FLONASE) 50 MCG/ACT nasal spray, 2 sprays by Each Nare route Daily. For allergies, Disp: 11.1 mL, Rfl: 10    hydroCHLOROthiazide 50 MG tablet, Take 1 tablet by mouth Daily. As needed for swelling, Disp: 90 tablet, Rfl: 1    Multiple Vitamins-Minerals  "(Multivitamin Gummies Adult) chewable tablet, Chew 2 each Daily., Disp: 180 tablet, Rfl: 3    omeprazole (priLOSEC) 40 MG capsule, Take 1 capsule by mouth Daily. For acid reflux, Disp: 90 capsule, Rfl: 1    oxyCODONE-acetaminophen (PERCOCET)  MG per tablet, Take 1 tablet by mouth 4 (Four) Times a Day., Disp: , Rfl:     polyethylene glycol (MIRALAX) 17 g packet, Take 17 g by mouth Daily. For constipation, Disp: 90 each, Rfl: 2    topiramate (TOPAMAX) 50 MG tablet, Take 1 tablet by mouth Every Night., Disp: 30 tablet, Rfl: 2    traZODone (DESYREL) 150 MG tablet, Take 1 tablet by mouth every night at bedtime., Disp: 30 tablet, Rfl: 0    VITAMIN C, CALCIUM ASCORBATE, PO, Take  by mouth., Disp: , Rfl:     zinc sulfate (ZINCATE) 220 (50 Zn) MG capsule, Take 1 capsule by mouth Daily., Disp: , Rfl:     amphetamine-dextroamphetamine XR (ADDERALL XR) 25 MG 24 hr capsule, Take 1 capsule by mouth Every Morning, Disp: 30 capsule, Rfl: 0    Medication Considerations:  EWA reviewed and appropriate.      Allergies:   Allergies   Allergen Reactions    Levofloxacin Unknown - Low Severity     Other reaction(s): joints hurt post taking    Nsaids Other (See Comments) and Myalgia     Hx ulcers    Other reaction(s): Other (See Comments)    Pt states upset stomach    contraindicated due to stomach ulcersPt states upset stomach    Adhesive Tape Rash     Surgical tape - bruising and rash  Surgical tape - bruising and rash         Results Reviewed:   screeners     Objective     Physical Exam:  Vital Signs:   Vitals:    01/27/25 1341   BP: 122/74   Pulse: 86   SpO2: 99%   Weight: 108 kg (239 lb)   Height: 170.2 cm (67\")     Body mass index is 37.43 kg/m².     Mental Status Exam:   Hygiene:   good  Cooperation:  Cooperative  Eye Contact:  Good  Psychomotor Behavior:  Appropriate  Affect:  Appropriate  Mood: normal  Speech:  Normal  Thought Process:  Goal directed and Linear  Thought Content:  Normal  Suicidal:  None  Homicidal:  " None  Hallucinations:  None  Delusion:  None  Memory:  Intact  Orientation:  Grossly intact  Reliability:  good  Insight:  Good  Judgement:  Fair  Impulse Control:  Fair  Physical/Medical Issues:   nothing reported today      Assessment / Plan      Visit Diagnosis/Orders Placed This Visit:  Diagnoses and all orders for this visit:    1. Attention deficit hyperactivity disorder (ADHD), combined type (Primary)  -     amphetamine-dextroamphetamine XR (ADDERALL XR) 25 MG 24 hr capsule; Take 1 capsule by mouth Every Morning  Dispense: 30 capsule; Refill: 0    2. Generalized anxiety disorder  -     amitriptyline (ELAVIL) 100 MG tablet; Take 1 tablet by mouth every night at bedtime.  Dispense: 30 tablet; Refill: 0  -     DULoxetine (CYMBALTA) 60 MG capsule; Take 2 capsules by mouth once daily.  Dispense: 60 capsule; Refill: 0  -     topiramate (TOPAMAX) 50 MG tablet; Take 1 tablet by mouth Every Night.  Dispense: 30 tablet; Refill: 2  -     traZODone (DESYREL) 150 MG tablet; Take 1 tablet by mouth every night at bedtime.  Dispense: 30 tablet; Refill: 0    3. Circadian rhythm sleep disorder, unspecified type  -     amitriptyline (ELAVIL) 100 MG tablet; Take 1 tablet by mouth every night at bedtime.  Dispense: 30 tablet; Refill: 0  -     traZODone (DESYREL) 150 MG tablet; Take 1 tablet by mouth every night at bedtime.  Dispense: 30 tablet; Refill: 0    4. Bipolar 1 disorder  -     DULoxetine (CYMBALTA) 60 MG capsule; Take 2 capsules by mouth once daily.  Dispense: 60 capsule; Refill: 0  -     topiramate (TOPAMAX) 50 MG tablet; Take 1 tablet by mouth Every Night.  Dispense: 30 tablet; Refill: 2    5. Post traumatic stress disorder (PTSD)  -     traZODone (DESYREL) 150 MG tablet; Take 1 tablet by mouth every night at bedtime.  Dispense: 30 tablet; Refill: 0    6. Obesity (BMI 30-39.9)  -     topiramate (TOPAMAX) 50 MG tablet; Take 1 tablet by mouth Every Night.  Dispense: 30 tablet; Refill: 2         Functional Status: Mild  impairment     Prognosis: Good with Ongoing Treatment     Impression/Formulation:  Patient appeared alert and oriented. Patient is receptive to assistance with maintaining a stable lifestyle.  Patient presents with history of     ICD-10-CM ICD-9-CM   1. Attention deficit hyperactivity disorder (ADHD), combined type  F90.2 314.01   2. Generalized anxiety disorder  F41.1 300.02   3. Circadian rhythm sleep disorder, unspecified type  G47.20 327.30   4. Bipolar 1 disorder  F31.9 296.7   5. Post traumatic stress disorder (PTSD)  F43.10 309.81   6. Obesity (BMI 30-39.9)  E66.9 278.00   Patient screened positive for depression based on a PHQ-9 score of 10 on 1/27/2025. Follow-up recommendations include: Prescribed antidepressant medication treatment.  Patient requests increase of adhd medication due to lingering focus/concentration/task completion issues.  Anxiety is improved.  Mild depressive symptoms continue but are improved.  Mood stable.    Treatment Plan:   Increase adderall xr  Continue topiramate, duloxetine, trazodone, amitriptyline    Patient will continue supportive psychotherapy efforts and medications as indicated. Clinic will obtain release of information for current treatment team for continuity of care as needed. Patient will contact this office, call 911 or present to the nearest emergency room should suicidal or homicidal ideations occur.  Discussed medication options and treatment plan of prescribed medication(s) as well as the risks, benefits, and potential side effects. Patient ackowledged and verbally consented to continue with current treatment plan and was educated on the importance of compliance with treatment and follow-up appointments.     Follow Up:   Return in about 3 months (around 4/27/2025) for Med Check.        ANDREA Daniels, MATEO-BC  [facility]     This is electronically signed by ANDREA Daniels, MONIKA  [unfilled] 15:40 EST

## 2025-01-29 DIAGNOSIS — K21.9 GASTROESOPHAGEAL REFLUX DISEASE WITHOUT ESOPHAGITIS: ICD-10-CM

## 2025-01-29 DIAGNOSIS — J30.2 SEASONAL ALLERGIES: ICD-10-CM

## 2025-01-29 DIAGNOSIS — K59.03 DRUG-INDUCED CONSTIPATION: ICD-10-CM

## 2025-01-29 DIAGNOSIS — I89.0 LYMPHEDEMA OF BOTH LOWER EXTREMITIES: ICD-10-CM

## 2025-01-29 RX ORDER — CETIRIZINE HYDROCHLORIDE 10 MG/1
10 TABLET ORAL DAILY
Qty: 90 TABLET | Refills: 3 | Status: SHIPPED | OUTPATIENT
Start: 2025-01-29

## 2025-01-29 NOTE — TELEPHONE ENCOUNTER
Rx Refill Note  Requested Prescriptions     Pending Prescriptions Disp Refills    cetirizine (zyrTEC) 10 MG tablet 90 tablet 3     Sig: Take 1 tablet by mouth Daily. For allergies      Last office visit with prescribing clinician: 5/14/2024   Last telemedicine visit with prescribing clinician: Visit date not found   Next office visit with prescribing clinician: Visit date not found    Yolanda Romano MA  01/29/25, 10:24 EST

## 2025-01-30 RX ORDER — POLYETHYLENE GLYCOL 3350 17 G/17G
17 POWDER, FOR SOLUTION ORAL DAILY
Qty: 90 EACH | Refills: 0 | Status: SHIPPED | OUTPATIENT
Start: 2025-01-30

## 2025-01-30 RX ORDER — OMEPRAZOLE 40 MG/1
40 CAPSULE, DELAYED RELEASE ORAL DAILY
Qty: 30 CAPSULE | Refills: 1 | Status: SHIPPED | OUTPATIENT
Start: 2025-01-30

## 2025-01-30 RX ORDER — HYDROCHLOROTHIAZIDE 50 MG/1
50 TABLET ORAL DAILY
Qty: 30 TABLET | Refills: 1 | Status: SHIPPED | OUTPATIENT
Start: 2025-01-30

## 2025-01-30 RX ORDER — FLUTICASONE PROPIONATE 50 MCG
2 SPRAY, SUSPENSION (ML) NASAL DAILY
Qty: 16 G | Refills: 1 | Status: SHIPPED | OUTPATIENT
Start: 2025-01-30

## 2025-02-05 ENCOUNTER — OFFICE VISIT (OUTPATIENT)
Dept: BEHAVIORAL HEALTH | Facility: CLINIC | Age: 50
End: 2025-02-05
Payer: COMMERCIAL

## 2025-02-05 DIAGNOSIS — F41.1 GENERALIZED ANXIETY DISORDER: ICD-10-CM

## 2025-02-05 DIAGNOSIS — F31.81 BIPOLAR 2 DISORDER, MAJOR DEPRESSIVE EPISODE: Primary | ICD-10-CM

## 2025-02-05 DIAGNOSIS — F43.10 POST TRAUMATIC STRESS DISORDER (PTSD): ICD-10-CM

## 2025-02-05 DIAGNOSIS — F90.2 ATTENTION DEFICIT HYPERACTIVITY DISORDER (ADHD), COMBINED TYPE: ICD-10-CM

## 2025-02-05 PROCEDURE — 1159F MED LIST DOCD IN RCRD: CPT | Performed by: SOCIAL WORKER

## 2025-02-05 PROCEDURE — 1160F RVW MEDS BY RX/DR IN RCRD: CPT | Performed by: SOCIAL WORKER

## 2025-02-05 PROCEDURE — 90834 PSYTX W PT 45 MINUTES: CPT | Performed by: SOCIAL WORKER

## 2025-02-05 NOTE — PROGRESS NOTES
Follow Up  Adult Note     Date:2025   Patient Name: Shelley Vo  : 1975   MRN: [id number]   Time IN: 11:15 am     Time OUT: 12:30 am     Referring Provider: Tracie Nettles PA-C    Chief Complaint:    No diagnosis found.     History of Present Illness:   Shelley Vo is a 49 y.o. female who presents to clinic today for Psychotherapy.    Shelley     - Lázaro   Daughter - Marissa 26 y/o   Son - Emil 30 y/o (by adoption)   Son - Max 7 y/o   Daughter - Brittani 6 y/o   Two grand daughters -  One grand son     Emil & Marissa have the same father  Emil has a different mother     [employer]adies group is part of my support group     Depression / Bipolar II  ADHD  PTSD  Anxiety     Health issues: bleeding ulcer / fissure in stomach / pre-cancerous cells  Near death experiences 7 times    I am [employer]   I rely on prayer     Anxiety  -tense in body - shoulder / neck   -worry  -catastrophic thinking   -go down this rabbit hole   -sweating  -yelling  -can't be touched  -sound brothers me  -hands shake  -twisting hair    Depression  -not constant  -from not wanting to do anything to being able to function when depressed   -past S/I as recent as 3 months ago  -nothings changed so why do I feel this way  -when I am out of fellowship with God I get depressed  -when I back in fellowship my mood changes  -situational depression   -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  1   Feeling down, depressed, or hopeless?  1   PHQ-2 Total Score  0   Trouble falling or staying asleep, or sleeping too much?  1   Feeling tired or having little energy?  2   Poor appetite or overeating?  2   Feeling bad about yourself - or that you are a failure or have let yourself or your family down?  1   Trouble concentrating on things, such as reading the newspaper or  watching television?  1   Moving or speaking so slowly that other people could have noticed? Or the opposite - being so fidgety or restless that you have been moving around a lot more than usual?  0   Thoughts that you would be better off dead, or of hurting yourself in some way?  0   PHQ-9 Total Score  9   If you checked off any problems, how difficult have these problems made it for you to do your work, take care of things at home, or get along with other people?  Somewhat difficult        CARMEN-7  Feeling nervous, anxious or on edge  1   Not being able to stop or control worrying  1   Worrying too much about different things  1   Trouble relaxing  1   Being so restless that it is hard to sit still  0   Becoming easily annoyed or irritable  1   Feeling Afraid as if something awful might happen  1   CARMEN Total Score  6   If you checked any problems, how difficult have these problems made it for you to do your work, take care of things at home or get along with other people?  Somewhat difficult      Triggers: (Persons/Places/Things/Events/Thought/Emotions): Stress     Medications:   Current Outpatient Medications:     alendronate (Fosamax) 70 MG tablet, Take 1 tablet by mouth Every 7 (Seven) Days. For osteopenia, Disp: 12 tablet, Rfl: 4    amitriptyline (ELAVIL) 100 MG tablet, Take 1 tablet by mouth every night at bedtime., Disp: 30 tablet, Rfl: 0    amphetamine-dextroamphetamine XR (ADDERALL XR) 25 MG 24 hr capsule, Take 1 capsule by mouth Every Morning, Disp: 30 capsule, Rfl: 0    cetirizine (zyrTEC) 10 MG tablet, Take 1 tablet by mouth Daily. For allergies, Disp: 90 tablet, Rfl: 3    DULoxetine (CYMBALTA) 60 MG capsule, Take 2 capsules by mouth once daily., Disp: 60 capsule, Rfl: 0    fluticasone (FLONASE) 50 MCG/ACT nasal spray, 2 sprays by Each Nare route Daily. For allergies, Disp: 16 g, Rfl: 1    hydroCHLOROthiazide 50 MG tablet, Take 1 tablet by mouth Daily. As needed for swelling, Disp: 30 tablet, Rfl: 1     Multiple Vitamins-Minerals (Multivitamin Gummies Adult) chewable tablet, Chew 2 each Daily., Disp: 180 tablet, Rfl: 3    omeprazole (priLOSEC) 40 MG capsule, Take 1 capsule by mouth Daily. For acid reflux, Disp: 30 capsule, Rfl: 1    oxyCODONE-acetaminophen (PERCOCET)  MG per tablet, Take 1 tablet by mouth 4 (Four) Times a Day., Disp: , Rfl:     polyethylene glycol (MIRALAX) 17 g packet, Take 17 g by mouth Daily. For constipation, Disp: 90 each, Rfl: 0    topiramate (TOPAMAX) 50 MG tablet, Take 1 tablet by mouth Every Night., Disp: 30 tablet, Rfl: 2    traZODone (DESYREL) 150 MG tablet, Take 1 tablet by mouth every night at bedtime., Disp: 30 tablet, Rfl: 0    VITAMIN C, CALCIUM ASCORBATE, PO, Take  by mouth., Disp: , Rfl:     zinc sulfate (ZINCATE) 220 (50 Zn) MG capsule, Take 1 capsule by mouth Daily., Disp: , Rfl:     Allergies:   Allergies   Allergen Reactions    Levofloxacin Unknown - Low Severity     Other reaction(s): joints hurt post taking    Nsaids Other (See Comments) and Myalgia     Hx ulcers    Other reaction(s): Other (See Comments)    Pt states upset stomach    contraindicated due to stomach ulcersPt states upset stomach    Adhesive Tape Rash     Surgical tape - bruising and rash  Surgical tape - bruising and rash       Objective     MENTAL STATUS EXAM   General Appearance:  Cleanly groomed and dressed  Eye Contact:  Good eye contact  Attitude:  Cooperative  Motor Activity:  Normal gait, posture  Muscle Strength:  Normal  Speech:  Normal rate, tone, volume  Language:  Spontaneous  Mood and affect:  Anxious and depressed  Thought Process:  Logical and goal-directed  Associations/ Thought Content:  No delusions  Hallucinations:  None  Suicidal Ideations:  Not present  Homicidal Ideation:  Not present  Sensorium:  Alert and clear  Orientation:  Person, place, situation and time  Immediate Recall, Recent, and Remote Memory:  Intact  Attention Span/ Concentration:  Good  Fund of Knowledge:   Appropriate for age and educational level  Intellectual Functioning:  Above average  Insight:  Good  Judgement:  Good  Reliability:  Good  Impulse Control:  Good     SUICIDE RISK ASSESSMENT/CSSRS:  1. Does patient have thoughts of suicide? no  2. Does patient have intent for suicide? no  3. Does patient have a current plan for suicide? no    Assessment / Plan      Visit Diagnosis/Orders Placed This Visit:  No diagnosis found.     PLAN:  Safety: No acute safety concerns  Risk Assessment: Risk of self-harm acutely is low. Risk of self-harm chronically is also low, but could be further elevated in the event of treatment noncompliance and/or AODA.    Treatment Plan/ Short and Long Term Goals: Continue supportive psychotherapy efforts and medications as indicated. Treatment and medication options discussed during today's visit. Patient ackowledged and verbally consented to continue with current treatment plan and was educated on the importance of compliance with treatment and follow-up appointments. Patient seems reasonably able to adhere to treatment plan.      Assisted Patient in processing above session content; acknowledged and normalized patient’s thoughts, feelings, and concerns.  Rationalized patient thought process regarding Transferred services. Discussed treatment approach - CBT / DBT / REBT / mari based counseling per request of client      Allowed Patient to freely discuss issues  without interruption or judgement with unconditional positive regard, active listening skills, and empathy. Therapist provided a safe, confidential environment to facilitate the development of a positive therapeutic relationship and encouraged open, honest communication. Assisted Patient in identifying risk factors which would indicate the need for higher level of care including thoughts to harm self or others and/or self-harming behavior and encouraged Patient to contact this office, call 911, or present to the nearest emergency  room should any of these events occur. Discussed crisis intervention services and means to access. Patient adamantly and convincingly denies current suicidal or homicidal ideation or perceptual disturbance. Assisted Patient in processing session content; acknowledged and normalized Patient’s thoughts, feelings, and concerns by utilizing a person-centered approach in efforts to build appropriate rapport and a positive therapeutic relationship with open and honest communication.     Follow Up:   No follow-ups on file.    MICHELINE GrandeW, KLPC [facility]

## 2025-02-07 DIAGNOSIS — F31.9 BIPOLAR 1 DISORDER: ICD-10-CM

## 2025-02-07 DIAGNOSIS — F41.1 GENERALIZED ANXIETY DISORDER: ICD-10-CM

## 2025-02-07 RX ORDER — DULOXETIN HYDROCHLORIDE 60 MG/1
CAPSULE, DELAYED RELEASE ORAL
Qty: 60 CAPSULE | Refills: 0 | OUTPATIENT
Start: 2025-02-07

## 2025-02-07 NOTE — TELEPHONE ENCOUNTER
Rx Refill Note    Requested Prescriptions     Refused Prescriptions Disp Refills    DULoxetine (CYMBALTA) 60 MG capsule [Pharmacy Med Name: DULoxetine HCL DR 60 MG CAPSULE] 60 capsule 0     Sig: TAKE 2 CAPSULES BY MOUTH DAILY     Refused By: VERO PEREZ     Reason for Refusal: Request already responded to by other means (e.g. phone or fax)        Last office visit with prescribing clinician: 1/27/2025      Next office visit with prescribing clinician: 4/24/2025   Last labs:   Last refill: 01/27/2025   Pharmacy (be sure to add in Epic). correct

## 2025-02-12 ENCOUNTER — OFFICE VISIT (OUTPATIENT)
Dept: BEHAVIORAL HEALTH | Facility: CLINIC | Age: 50
End: 2025-02-12
Payer: COMMERCIAL

## 2025-02-12 DIAGNOSIS — F32.A MODERATE DEPRESSIVE DISORDER: Primary | ICD-10-CM

## 2025-02-12 DIAGNOSIS — F41.1 GENERALIZED ANXIETY DISORDER: ICD-10-CM

## 2025-02-12 DIAGNOSIS — F90.2 ATTENTION DEFICIT HYPERACTIVITY DISORDER (ADHD), COMBINED TYPE: ICD-10-CM

## 2025-02-12 DIAGNOSIS — F31.9 BIPOLAR 1 DISORDER: ICD-10-CM

## 2025-02-12 PROCEDURE — 1159F MED LIST DOCD IN RCRD: CPT | Performed by: SOCIAL WORKER

## 2025-02-12 PROCEDURE — 90834 PSYTX W PT 45 MINUTES: CPT | Performed by: SOCIAL WORKER

## 2025-02-12 PROCEDURE — 1160F RVW MEDS BY RX/DR IN RCRD: CPT | Performed by: SOCIAL WORKER
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
## 2025-02-26 ENCOUNTER — OFFICE VISIT (OUTPATIENT)
Dept: BEHAVIORAL HEALTH | Facility: CLINIC | Age: 50
End: 2025-02-26
Payer: COMMERCIAL

## 2025-02-26 DIAGNOSIS — F43.10 POST TRAUMATIC STRESS DISORDER (PTSD): ICD-10-CM

## 2025-02-26 DIAGNOSIS — F90.2 ATTENTION DEFICIT HYPERACTIVITY DISORDER (ADHD), COMBINED TYPE: ICD-10-CM

## 2025-02-26 DIAGNOSIS — F32.A MODERATE DEPRESSIVE DISORDER: Primary | ICD-10-CM

## 2025-02-26 DIAGNOSIS — F41.1 GENERALIZED ANXIETY DISORDER: ICD-10-CM

## 2025-02-26 PROCEDURE — 90834 PSYTX W PT 45 MINUTES: CPT | Performed by: SOCIAL WORKER

## 2025-02-26 NOTE — PROGRESS NOTES
Follow Up  Adult Note     Date:2025   Patient Name: Shelley Vo  : 1975   MRN: [id number]   Time IN: 11:45 am     Time OUT: 12:30 pm      Referring Provider: Tracie Nettles PA-C    Chief Complaint:      ICD-10-CM ICD-9-CM   1. Moderate depressive disorder  F32.A 311   2. Generalized anxiety disorder  F41.1 300.02   3. Post traumatic stress disorder (PTSD)  F43.10 309.81   4. Attention deficit hyperactivity disorder (ADHD), combined type  F90.2 314.01      History of Present Illness:   Shelley Vo is a 49 y.o. female who presents to clinic today for Psychotherapy.     - Lázaro   Daughter - Marissa 26 y/o   Son - Emil 32 y/o (by adoption)   Son - Max 9 y/o   Daughter - Brittani 8 y/o   Two grand daughters -  One grand son     Emil & Marissa have the same father  Emil has a different mother      Ladies group is part of my support group      Depression / Bipolar II  ADHD  PTSD  Anxiety      Money related stress  -power  -cell phones  -school supplies  -children's needs     Bleeding ulcer    Stressed out.   -whole house had the flu  -still waiting for belated Nadege   -house is a mess  -uncle passed away / fell and broke his neck  -'s uncle   - is not helpful     Issues:   Death in family  Sickness in family  Belated holiday celebration  Disorganization in the house  No money to get washer repaired  Financial pressures     Subjective     Triggers: (Persons/Places/Things/Events/Thought/Emotions): Situational stress / death in family     Medications:   Current Outpatient Medications:     alendronate (Fosamax) 70 MG tablet, Take 1 tablet by mouth Every 7 (Seven) Days. For osteopenia, Disp: 12 tablet, Rfl: 4    amitriptyline (ELAVIL) 100 MG tablet, Take 1 tablet by mouth every night at bedtime., Disp: 30 tablet, Rfl: 0    amphetamine-dextroamphetamine XR (ADDERALL XR) 25 MG 24 hr capsule, Take 1 capsule by mouth Every Morning, Disp: 30 capsule, Rfl: 0    cetirizine  (zyrTEC) 10 MG tablet, Take 1 tablet by mouth Daily. For allergies, Disp: 90 tablet, Rfl: 3    DULoxetine (CYMBALTA) 60 MG capsule, Take 2 capsules by mouth once daily., Disp: 60 capsule, Rfl: 0    fluticasone (FLONASE) 50 MCG/ACT nasal spray, 2 sprays by Each Nare route Daily. For allergies, Disp: 16 g, Rfl: 1    hydroCHLOROthiazide 50 MG tablet, Take 1 tablet by mouth Daily. As needed for swelling, Disp: 30 tablet, Rfl: 1    Multiple Vitamins-Minerals (Multivitamin Gummies Adult) chewable tablet, Chew 2 each Daily., Disp: 180 tablet, Rfl: 3    omeprazole (priLOSEC) 40 MG capsule, Take 1 capsule by mouth Daily. For acid reflux, Disp: 30 capsule, Rfl: 1    oxyCODONE-acetaminophen (PERCOCET)  MG per tablet, Take 1 tablet by mouth 4 (Four) Times a Day., Disp: , Rfl:     polyethylene glycol (MIRALAX) 17 g packet, Take 17 g by mouth Daily. For constipation, Disp: 90 each, Rfl: 0    topiramate (TOPAMAX) 50 MG tablet, Take 1 tablet by mouth Every Night., Disp: 30 tablet, Rfl: 2    traZODone (DESYREL) 150 MG tablet, Take 1 tablet by mouth every night at bedtime., Disp: 30 tablet, Rfl: 0    VITAMIN C, CALCIUM ASCORBATE, PO, Take  by mouth., Disp: , Rfl:     zinc sulfate (ZINCATE) 220 (50 Zn) MG capsule, Take 1 capsule by mouth Daily., Disp: , Rfl:     Allergies:   Allergies   Allergen Reactions    Levofloxacin Unknown - Low Severity     Other reaction(s): joints hurt post taking    Nsaids Other (See Comments) and Myalgia     Hx ulcers    Other reaction(s): Other (See Comments)    Pt states upset stomach    contraindicated due to stomach ulcersPt states upset stomach    Adhesive Tape Rash     Surgical tape - bruising and rash  Surgical tape - bruising and rash       Objective     MENTAL STATUS EXAM   General Appearance:  Cleanly groomed and dressed  Eye Contact:  Good eye contact  Attitude:  Cooperative  Motor Activity:  Normal gait, posture  Muscle Strength:  Normal  Speech:  Normal rate, tone, volume  Language:   Spontaneous  Mood and affect:  Depressed and anxious  Thought Process:  Logical and goal-directed  Associations/ Thought Content:  No delusions  Hallucinations:  None  Suicidal Ideations:  Not present  Sensorium:  Alert and clear  Orientation:  Person, place, time and situation  Immediate Recall, Recent, and Remote Memory:  Intact  Attention Span/ Concentration:  Good  Fund of Knowledge:  Appropriate for age and educational level  Intellectual Functioning:  Above average  Insight:  Good  Judgement:  Good  Reliability:  Good  Impulse Control:  Good     SUICIDE RISK ASSESSMENT/CSSRS:  1. Does patient have thoughts of suicide? no  2. Does patient have intent for suicide? no  3. Does patient have a current plan for suicide? no    Assessment / Plan      Visit Diagnosis/Orders Placed This Visit:    ICD-10-CM ICD-9-CM   1. Moderate depressive disorder  F32.A 311   2. Generalized anxiety disorder  F41.1 300.02   3. Post traumatic stress disorder (PTSD)  F43.10 309.81   4. Attention deficit hyperactivity disorder (ADHD), combined type  F90.2 314.01        PLAN:  Safety: No acute safety concerns  Risk Assessment: Risk of self-harm acutely is low. Risk of self-harm chronically is also low, but could be further elevated in the event of treatment noncompliance and/or AODA.    Treatment Plan/ Short and Long Term Goals: Continue supportive psychotherapy efforts and medications as indicated. Treatment and medication options discussed during today's visit. Patient ackowledged and verbally consented to continue with current treatment plan and was educated on the importance of compliance with treatment and follow-up appointments. Patient seems reasonably able to adhere to treatment plan.      Assisted Patient in processing above session content; acknowledged and normalized patient’s thoughts, feelings, and concerns.  Rationalized patient thought process regarding Completed treatment plan. .      Allowed Patient to freely discuss  issues  without interruption or judgement with unconditional positive regard, active listening skills, and empathy. Therapist provided a safe, confidential environment to facilitate the development of a positive therapeutic relationship and encouraged open, honest communication. Assisted Patient in identifying risk factors which would indicate the need for higher level of care including thoughts to harm self or others and/or self-harming behavior and encouraged Patient to contact this office, call 911, or present to the nearest emergency room should any of these events occur. Discussed crisis intervention services and means to access. Patient adamantly and convincingly denies current suicidal or homicidal ideation or perceptual disturbance. Assisted Patient in processing session content; acknowledged and normalized Patient’s thoughts, feelings, and concerns by utilizing a person-centered approach in efforts to build appropriate rapport and a positive therapeutic relationship with open and honest communication.     Follow Up:   Return in about 1 week (around 3/5/2025) for Psychoterapy.    MICHELINE GrandeW, [facility]

## 2025-03-07 DIAGNOSIS — F41.1 GENERALIZED ANXIETY DISORDER: ICD-10-CM

## 2025-03-07 DIAGNOSIS — G47.20 CIRCADIAN RHYTHM SLEEP DISORDER, UNSPECIFIED TYPE: ICD-10-CM

## 2025-03-07 NOTE — TELEPHONE ENCOUNTER
Rx Refill Note    Requested Prescriptions     Pending Prescriptions Disp Refills    amitriptyline (ELAVIL) 100 MG tablet [Pharmacy Med Name: AMITRIPTYLINE 100MG (HUNDRED MG)TAB] 30 tablet 0     Sig: TAKE 1 TABLET BY MOUTH EVERY NIGHT AT BEDTIME        Last office visit with prescribing clinician: 1/27/2025      Next office visit with prescribing clinician: 4/24/2025   Last labs:   Last refill: 01/27/2025   Pharmacy (be sure to add in Epic). correct

## 2025-03-10 RX ORDER — AMITRIPTYLINE HYDROCHLORIDE 100 MG/1
100 TABLET ORAL
Qty: 30 TABLET | Refills: 0 | Status: SHIPPED | OUTPATIENT
Start: 2025-03-10

## 2025-03-12 ENCOUNTER — OFFICE VISIT (OUTPATIENT)
Dept: BEHAVIORAL HEALTH | Facility: CLINIC | Age: 50
End: 2025-03-12
Payer: COMMERCIAL

## 2025-03-12 DIAGNOSIS — F32.A MODERATE DEPRESSIVE DISORDER: Primary | ICD-10-CM

## 2025-03-12 DIAGNOSIS — F41.1 GENERALIZED ANXIETY DISORDER: ICD-10-CM

## 2025-03-12 DIAGNOSIS — F90.2 ATTENTION DEFICIT HYPERACTIVITY DISORDER (ADHD), COMBINED TYPE: ICD-10-CM

## 2025-03-12 PROCEDURE — 90834 PSYTX W PT 45 MINUTES: CPT | Performed by: SOCIAL WORKER

## 2025-03-12 RX ORDER — DEXTROAMPHETAMINE SACCHARATE, AMPHETAMINE ASPARTATE MONOHYDRATE, DEXTROAMPHETAMINE SULFATE AND AMPHETAMINE SULFATE 6.25; 6.25; 6.25; 6.25 MG/1; MG/1; MG/1; MG/1
25 CAPSULE, EXTENDED RELEASE ORAL EVERY MORNING
Qty: 30 CAPSULE | Refills: 0 | Status: SHIPPED | OUTPATIENT
Start: 2025-03-12

## 2025-03-12 NOTE — TELEPHONE ENCOUNTER
Incoming Refill Request      Medication requested (name and dose):     AMPHETAMINE-DEXTROAMPHETAMINE XR (ADDERALL XR) 25 MG     [facility]     Additional details provided by patient:     PT IS OUT OF MEDICATION     Best call back number: [phone]    Does the patient have less than a 3 day supply:  [x] Yes  [] No    Lynsey Madeleine KleberMargaret Rep  03/12/25, 15:44 EDT        {Tip  DIRECTIONS Send the encounter HIGH priority, If patient has less than a 3 day supply. If the patient will run out of medication over the weekend add that information to the additional details line. Send this encounter to the clinical pool:[id number]
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
## 2025-03-19 DIAGNOSIS — G47.20 CIRCADIAN RHYTHM SLEEP DISORDER, UNSPECIFIED TYPE: ICD-10-CM

## 2025-03-19 DIAGNOSIS — F43.10 POST TRAUMATIC STRESS DISORDER (PTSD): ICD-10-CM

## 2025-03-19 DIAGNOSIS — F41.1 GENERALIZED ANXIETY DISORDER: ICD-10-CM

## 2025-03-19 RX ORDER — TRAZODONE HYDROCHLORIDE 150 MG/1
150 TABLET ORAL
Qty: 30 TABLET | Refills: 0 | Status: SHIPPED | OUTPATIENT
Start: 2025-03-19

## 2025-03-19 NOTE — TELEPHONE ENCOUNTER
Rx Refill Note    Requested Prescriptions     Pending Prescriptions Disp Refills    traZODone (DESYREL) 150 MG tablet [Pharmacy Med Name: TRAZODONE 150MG (HUNDRED-FIFTY) TAB] 30 tablet 0     Sig: TAKE 1 TABLET BY MOUTH EVERY NIGHT AT BEDTIME        Last office visit with prescribing clinician: 1/27/2025      Next office visit with prescribing clinician: 4/24/2025   Last labs:   Last refill: 01/27/2025   Pharmacy (be sure to add in Epic). correct

## 2025-04-01 ENCOUNTER — INFUSION (OUTPATIENT)
Dept: ONCOLOGY | Facility: HOSPITAL | Age: 50
End: 2025-04-01
Payer: COMMERCIAL

## 2025-04-01 ENCOUNTER — CONSULT (OUTPATIENT)
Dept: ONCOLOGY | Facility: CLINIC | Age: 50
End: 2025-04-01
Payer: COMMERCIAL

## 2025-04-01 VITALS
HEIGHT: 67 IN | DIASTOLIC BLOOD PRESSURE: 83 MMHG | WEIGHT: 238 LBS | HEART RATE: 103 BPM | RESPIRATION RATE: 18 BRPM | SYSTOLIC BLOOD PRESSURE: 134 MMHG | TEMPERATURE: 97.8 F | BODY MASS INDEX: 37.35 KG/M2 | OXYGEN SATURATION: 98 %

## 2025-04-01 DIAGNOSIS — K25.4 CHRONIC GASTRIC ULCER WITH HEMORRHAGE: ICD-10-CM

## 2025-04-01 DIAGNOSIS — E53.8 B12 DEFICIENCY: Primary | ICD-10-CM

## 2025-04-01 DIAGNOSIS — F31.9 BIPOLAR 1 DISORDER: ICD-10-CM

## 2025-04-01 DIAGNOSIS — E53.8 B12 DEFICIENCY: ICD-10-CM

## 2025-04-01 DIAGNOSIS — D50.8 IRON DEFICIENCY ANEMIA SECONDARY TO INADEQUATE DIETARY IRON INTAKE: ICD-10-CM

## 2025-04-01 DIAGNOSIS — F41.1 GENERALIZED ANXIETY DISORDER: ICD-10-CM

## 2025-04-01 DIAGNOSIS — K25.4 CHRONIC GASTRIC ULCER WITH HEMORRHAGE: Primary | ICD-10-CM

## 2025-04-01 PROCEDURE — 25010000002 CYANOCOBALAMIN PER 1000 MCG: Performed by: INTERNAL MEDICINE

## 2025-04-01 PROCEDURE — 96372 THER/PROPH/DIAG INJ SC/IM: CPT

## 2025-04-01 RX ORDER — CYANOCOBALAMIN 1000 UG/ML
1000 INJECTION, SOLUTION INTRAMUSCULAR; SUBCUTANEOUS ONCE
OUTPATIENT
Start: 2025-04-30

## 2025-04-01 RX ORDER — CYANOCOBALAMIN 1000 UG/ML
1000 INJECTION, SOLUTION INTRAMUSCULAR; SUBCUTANEOUS ONCE
Status: CANCELLED | OUTPATIENT
Start: 2025-04-02

## 2025-04-01 RX ORDER — CYANOCOBALAMIN 1000 UG/ML
1000 INJECTION, SOLUTION INTRAMUSCULAR; SUBCUTANEOUS ONCE
Status: COMPLETED | OUTPATIENT
Start: 2025-04-01 | End: 2025-04-01

## 2025-04-01 RX ORDER — CYANOCOBALAMIN 1000 UG/ML
1000 INJECTION, SOLUTION INTRAMUSCULAR; SUBCUTANEOUS ONCE
OUTPATIENT
Start: 2025-05-28

## 2025-04-01 RX ADMIN — CYANOCOBALAMIN 1000 MCG: 1000 INJECTION, SOLUTION INTRAMUSCULAR at 12:38

## 2025-04-01 NOTE — PROGRESS NOTES
"CHIEF COMPLAINT: Mild abdominal discomfort  Reason for referral: Gastric bypass surgery with malabsorptive B12 and iron deficiency  Problem List:  Oncology/Hematology History Overview Note   1.  Anemia in the face of history of gastric bypass  2.  Chronic biliary pancreatitis    -2/28/2025 [facility] hematology oncology consultation Dr. Chinedu Cherry [facility] indicates patient has longstanding history of iron deficiency anemia felt to be related to gastric bypass surgery originally 2002 along with bleeding ulcers that required revision of gastric bypass twice after that.  Upper endoscopy 12/6/2024 told that she had a gastric ulcer, hiatal hernia, Crowell's esophagus and possible fistula.  Denies melena or hematochezia per his note.  Has a history of colon polyps and last colonoscopy was several years ago.  Does not tolerate oral iron as they \"go right through her\".  Had previously seen hematology with his group, .  Received Feraheme 2014 and 2018 and Venofer in late 2020, late 2021 last dose 9/14/2021.  Had not been seen by their clinic since July 2021 prior to that visit of this day.  As of that junction hemoglobin 9.4 MCV 91.3 ferritin 5.1 iron 27 saturation 6% total iron binding capacity 438.  He plans to administer IV iron again and check B12 and folate in light of gastric bypass surgery.  He also referred to GI for repeat EGD and colonoscopy.  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  He plan to see her back in 2 months.  B12 that day was low at 143 and folic acid normal 8.    -4/1/2025 [facility] consult: I reviewed the above data with the patient.  She has started B12 weekly and was given 3 doses of Venofer within the last month in [place] but wants to get the rest of her care hematologically here in [place].  She needs an EGD and " "colonoscopy which I will set up with Dr. William.  She gets her fourth of 4 weekly B12 injections and then goes to monthly which we will give here in [place] and I will repeat her iron panel, B12, methylmalonic acid, homocystine, folic acid, CBC early .      No history exists.       HISTORY OF PRESENT ILLNESS:  The patient is a 49 y.o. female, here for follow up on management of oral iron intolerance and history of gastric bypass surgery now with B12 deficiency and iron deficiency having received Venofer in [place] within the last month and 3 out of 4 weekly B12 injections.    Past Medical History:   Diagnosis Date    Parathyroid adenoma 2018    Stricture of bile duct 2023    Thyroid nodule 10/24/2018    Vitamin D deficiency 2017     Past Surgical History:   Procedure Laterality Date    ABDOMINAL SURGERY      due to ulcers     SECTION      COLON RESECTION      GALLBLADDER SURGERY      GASTRIC BYPASS         Allergies   Allergen Reactions    Levofloxacin Unknown - Low Severity     Other reaction(s): joints hurt post taking    Nsaids Other (See Comments) and Myalgia     Hx ulcers    Other reaction(s): Other (See Comments)    Pt states upset stomach    contraindicated due to stomach ulcersPt states upset stomach    Adhesive Tape Rash     Surgical tape - bruising and rash  Surgical tape - bruising and rash         Family History and Social History reviewed and changed as necessary    REVIEW OF SYSTEM:   No change in color caliber or consistency of her stools    PHYSICAL EXAM:  No jaundice icterus or pallor.  No respiratory distress.    Vitals:    25 1112   BP: 134/83   Pulse: 103   Resp: 18   Temp: 97.8 °F (36.6 °C)   SpO2: 98%   Weight: 108 kg (238 lb)   Height: 170.2 cm (67\")     Vitals:    25 1112   PainSc: 6    PainLoc: Leg  Comment: bilateral legs          ECOG score: 0           Vitals reviewed.    ECOG: (0) Fully Active - Able to Carry On All Pre-disease Performance " "Without Restriction    Lab Results   Component Value Date    HGB 9.5 (L) 05/14/2024    HCT 32.1 (L) 05/14/2024    MCV 90 05/14/2024     05/14/2024    WBC 5.3 05/14/2024    NEUTROABS 3.3 05/14/2024    LYMPHSABS 1.5 05/14/2024    MONOSABS 0.4 05/14/2024    EOSABS 0.0 05/14/2024    BASOSABS 0.0 05/14/2024       Lab Results   Component Value Date    GLUCOSE 79 05/14/2024    BUN 18 05/14/2024    CREATININE 0.73 05/14/2024     05/14/2024    K 4.6 05/14/2024     05/14/2024    CO2 22 05/14/2024    CALCIUM 9.1 05/14/2024    PROTEINTOT 6.7 05/14/2024    ALBUMIN 3.9 05/14/2024    BILITOT <0.2 05/14/2024    ALKPHOS 157 (H) 05/14/2024    AST 25 05/14/2024    ALT 15 05/14/2024             ASSESSMENT & PLAN:  1.  Anemia in the face of history of gastric bypass  2.  Chronic biliary pancreatitis    -2/28/2025 [facility] hematology oncology consultation Dr. Chinedu Cherry [facility] indicates patient has longstanding history of iron deficiency anemia felt to be related to gastric bypass surgery originally 2002 along with bleeding ulcers that required revision of gastric bypass twice after that.  Upper endoscopy 12/6/2024 told that she had a gastric ulcer, hiatal hernia, Crowell's esophagus and possible fistula.  Denies melena or hematochezia per his note.  Has a history of colon polyps and last colonoscopy was several years ago.  Does not tolerate oral iron as they \"go right through her\".  Had previously seen hematology with his group, .  Received Feraheme 2014 and 2018 and Venofer in late 2020, late 2021 last dose 9/14/2021.  Had not been seen by their clinic since July 2021 prior to that visit of this day.  As of that junction hemoglobin 9.4 MCV 91.3 ferritin 5.1 iron 27 saturation 6% total iron binding capacity 438.  He plans to administer IV iron again and check B12 and folate in light of gastric bypass surgery.  He also referred to GI for repeat EGD and colonoscopy.  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  He plan to see her back in 2 months.  B12 that day was low at 143 and folic acid normal 8.    -4/1/2025 [facility] hematology consult: I reviewed the above data with the patient.  She has started B12 weekly and was given 3 doses of Venofer within the last month in [place] but wants to get the rest of her care hematologically here in [place].  She needs an EGD and colonoscopy which I will set up with Dr. William.  She gets her fourth of 4 weekly B12 injections and then goes to monthly which we will give here in [place] and I will repeat her iron panel, B12, methylmalonic acid, homocystine, folic acid, CBC early June.    Total time of care today inclusive of time spent today prior to patient's arrival reviewing interval data and during visit translating that patient putting forth plan as outlined above and after visit instituting this plan took 1 hour patient care time throughout the day today.  Alek Méndez MD    04/01/2025

## 2025-04-01 NOTE — LETTER
"April 1, 2025     Tracie Nettles PA-C  [address] [place] [address]    Patient: Shelley Vo   YOB: 1975   Date of Visit: 4/1/2025     Dear Tracie Nettles PA-C:       Thank you for referring Shelley Vo to me for evaluation. Below are the relevant portions of my assessment and plan of care.    If you have questions, please do not hesitate to call me. I look forward to following Shelley along with you.         Sincerely,        Alek Méndez MD        CC: Jesse Ortiz, ANDREA Hoff, [facility]    Alek Méndez MD  04/01/25 1224  Sign when Signing Visit  CHIEF COMPLAINT: Mild abdominal discomfort  Reason for referral: Gastric bypass surgery with malabsorptive B12 and iron deficiency  Problem List:  Oncology/Hematology History Overview Note   1.  Anemia in the face of history of gastric bypass  2.  Chronic biliary pancreatitis    -2/28/2025 [facility] hematology oncology consultation Dr. Chinedu Cherry [facility] indicates patient has longstanding history of iron deficiency anemia felt to be related to gastric bypass surgery originally 2002 along with bleeding ulcers that required revision of gastric bypass twice after that.  Upper endoscopy 12/6/2024 told that she had a gastric ulcer, hiatal hernia, Crowell's esophagus and possible fistula.  Denies melena or hematochezia per his note.  Has a history of colon polyps and last colonoscopy was several years ago.  Does not tolerate oral iron as they \"go right through her\".  Had previously seen hematology with his group, .  Received Feraheme 2014 and 2018 and Venofer in late 2020, late 2021 last dose 9/14/2021.  Had not been seen by their clinic since July 2021 prior to that visit of this day.  As of that junction hemoglobin 9.4 MCV 91.3 ferritin 5.1 iron 27 saturation 6% total iron binding capacity 438.  He plans to administer IV iron again and check B12 and folate in light of gastric bypass surgery.  He also " referred to GI for repeat EGD and colonoscopy.  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  He plan to see her back in 2 months.  B12 that day was low at 143 and folic acid normal 8.    -2025 [facility] hematology consult: I reviewed the above data with the patient.  She has started B12 weekly and was given 3 doses of Venofer within the last month in [place] but wants to get the rest of her care hematologically here in [place].  She needs an EGD and colonoscopy which I will set up with Dr. William.  She gets her fourth of 4 weekly B12 injections and then goes to monthly which we will give here in [place] and I will repeat her iron panel, B12, methylmalonic acid, homocystine, folic acid, CBC early .      No history exists.       HISTORY OF PRESENT ILLNESS:  The patient is a 49 y.o. female, here for follow up on management of oral iron intolerance and history of gastric bypass surgery now with B12 deficiency and iron deficiency having received Venofer in [place] within the last month and 3 out of 4 weekly B12 injections.    Past Medical History:   Diagnosis Date   • Parathyroid adenoma 2018   • Stricture of bile duct 2023   • Thyroid nodule 10/24/2018   • Vitamin D deficiency 2017     Past Surgical History:   Procedure Laterality Date   • ABDOMINAL SURGERY      due to ulcers   •  SECTION     • COLON RESECTION     • GALLBLADDER SURGERY     • GASTRIC BYPASS         Allergies   Allergen Reactions   • Levofloxacin Unknown - Low Severity     Other reaction(s): joints hurt post taking   • Nsaids Other (See Comments) and Myalgia     Hx ulcers    Other reaction(s): Other (See Comments)    Pt states upset stomach    contraindicated due to stomach ulcersPt states upset stomach   • Adhesive Tape Rash     Surgical tape - bruising and rash  Surgical tape - bruising  "and rash         Family History and Social History reviewed and changed as necessary    REVIEW OF SYSTEM:   No change in color caliber or consistency of her stools    PHYSICAL EXAM:  No jaundice icterus or pallor.  No respiratory distress.    Vitals:    04/01/25 1112   BP: 134/83   Pulse: 103   Resp: 18   Temp: 97.8 °F (36.6 °C)   SpO2: 98%   Weight: 108 kg (238 lb)   Height: 170.2 cm (67\")     Vitals:    04/01/25 1112   PainSc: 6    PainLoc: Leg  Comment: bilateral legs          ECOG score: 0           Vitals reviewed.    ECOG: (0) Fully Active - Able to Carry On All Pre-disease Performance Without Restriction    Lab Results   Component Value Date    HGB 9.5 (L) 05/14/2024    HCT 32.1 (L) 05/14/2024    MCV 90 05/14/2024     05/14/2024    WBC 5.3 05/14/2024    NEUTROABS 3.3 05/14/2024    LYMPHSABS 1.5 05/14/2024    MONOSABS 0.4 05/14/2024    EOSABS 0.0 05/14/2024    BASOSABS 0.0 05/14/2024       Lab Results   Component Value Date    GLUCOSE 79 05/14/2024    BUN 18 05/14/2024    CREATININE 0.73 05/14/2024     05/14/2024    K 4.6 05/14/2024     05/14/2024    CO2 22 05/14/2024    CALCIUM 9.1 05/14/2024    PROTEINTOT 6.7 05/14/2024    ALBUMIN 3.9 05/14/2024    BILITOT <0.2 05/14/2024    ALKPHOS 157 (H) 05/14/2024    AST 25 05/14/2024    ALT 15 05/14/2024             ASSESSMENT & PLAN:  1.  Anemia in the face of history of gastric bypass  2.  Chronic biliary pancreatitis    -2/28/2025 [facility] hematology oncology consultation Dr. Chinedu Cherry [facility] indicates patient has longstanding history of iron deficiency anemia felt to be related to gastric bypass surgery originally 2002 along with bleeding ulcers that required revision of gastric bypass twice after that.  Upper endoscopy 12/6/2024 told that she had a gastric ulcer, hiatal hernia, Crowell's esophagus and possible fistula.  Denies melena or hematochezia per his note.  Has a history of colon polyps and last " "colonoscopy was several years ago.  Does not tolerate oral iron as they \"go right through her\".  Had previously seen hematology with his group, .  Received Feraheme 2014 and 2018 and Venofer in late 2020, late 2021 last dose 9/14/2021.  Had not been seen by their clinic since July 2021 prior to that visit of this day.  As of that junction hemoglobin 9.4 MCV 91.3 ferritin 5.1 iron 27 saturation 6% total iron binding capacity 438.  He plans to administer IV iron again and check B12 and folate in light of gastric bypass surgery.  He also referred to GI for repeat EGD and colonoscopy.  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  He plan to see her back in 2 months.  B12 that day was low at 143 and folic acid normal 8.    -4/1/2025 [facility] consult: I reviewed the above data with the patient.  She has started B12 weekly and was given 3 doses of Venofer within the last month in [place] but wants to get the rest of her care hematologically here in [place].  She needs an EGD and colonoscopy which I will set up with Dr. William.  She gets her fourth of 4 weekly B12 injections and then goes to monthly which we will give here in [place] and I will repeat her iron panel, B12, methylmalonic acid, homocystine, folic acid, CBC early June.    Total time of care today inclusive of time spent today prior to patient's arrival reviewing interval data and during visit translating that patient putting forth plan as outlined above and after visit instituting this plan took 1 hour patient care time throughout the day today.  Alek Méndez MD    04/01/2025      "

## 2025-04-02 RX ORDER — DULOXETIN HYDROCHLORIDE 60 MG/1
120 CAPSULE, DELAYED RELEASE ORAL DAILY
Qty: 60 CAPSULE | Refills: 0 | Status: SHIPPED | OUTPATIENT
Start: 2025-04-02

## 2025-04-12 DIAGNOSIS — F41.1 GENERALIZED ANXIETY DISORDER: ICD-10-CM

## 2025-04-12 DIAGNOSIS — G47.20 CIRCADIAN RHYTHM SLEEP DISORDER, UNSPECIFIED TYPE: ICD-10-CM

## 2025-04-14 RX ORDER — AMITRIPTYLINE HYDROCHLORIDE 100 MG/1
100 TABLET ORAL
Qty: 30 TABLET | Refills: 0 | Status: SHIPPED | OUTPATIENT
Start: 2025-04-14

## 2025-04-14 NOTE — TELEPHONE ENCOUNTER
Rx Refill Note    Requested Prescriptions     Pending Prescriptions Disp Refills    amitriptyline (ELAVIL) 100 MG tablet [Pharmacy Med Name: AMITRIPTYLINE 100MG (HUNDRED MG)TAB] 30 tablet 0     Sig: TAKE 1 TABLET BY MOUTH EVERY NIGHT AT BEDTIME        Last office visit with prescribing clinician: 1/27/2025      Next office visit with prescribing clinician: 4/24/2025   Last labs:   Last refill: 03/10/2025   Pharmacy (be sure to add in Epic). correct

## 2025-04-15 DIAGNOSIS — F90.2 ATTENTION DEFICIT HYPERACTIVITY DISORDER (ADHD), COMBINED TYPE: ICD-10-CM

## 2025-04-15 RX ORDER — DEXTROAMPHETAMINE SACCHARATE, AMPHETAMINE ASPARTATE MONOHYDRATE, DEXTROAMPHETAMINE SULFATE AND AMPHETAMINE SULFATE 6.25; 6.25; 6.25; 6.25 MG/1; MG/1; MG/1; MG/1
25 CAPSULE, EXTENDED RELEASE ORAL EVERY MORNING
Qty: 30 CAPSULE | Refills: 0 | Status: SHIPPED | OUTPATIENT
Start: 2025-04-15

## 2025-04-23 ENCOUNTER — OFFICE VISIT (OUTPATIENT)
Dept: BEHAVIORAL HEALTH | Facility: CLINIC | Age: 50
End: 2025-04-23
Payer: COMMERCIAL

## 2025-04-23 DIAGNOSIS — F90.2 ATTENTION DEFICIT HYPERACTIVITY DISORDER (ADHD), COMBINED TYPE: ICD-10-CM

## 2025-04-23 DIAGNOSIS — F31.9 BIPOLAR 1 DISORDER: Primary | ICD-10-CM

## 2025-04-23 PROCEDURE — 1160F RVW MEDS BY RX/DR IN RCRD: CPT | Performed by: SOCIAL WORKER

## 2025-04-23 PROCEDURE — 1159F MED LIST DOCD IN RCRD: CPT | Performed by: SOCIAL WORKER

## 2025-04-23 PROCEDURE — 90834 PSYTX W PT 45 MINUTES: CPT | Performed by: SOCIAL WORKER
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
## 2025-04-24 ENCOUNTER — OFFICE VISIT (OUTPATIENT)
Dept: BEHAVIORAL HEALTH | Facility: CLINIC | Age: 50
End: 2025-04-24
Payer: COMMERCIAL

## 2025-04-24 VITALS
HEIGHT: 67 IN | HEART RATE: 64 BPM | DIASTOLIC BLOOD PRESSURE: 70 MMHG | BODY MASS INDEX: 37.28 KG/M2 | OXYGEN SATURATION: 97 % | SYSTOLIC BLOOD PRESSURE: 134 MMHG

## 2025-04-24 DIAGNOSIS — F31.9 BIPOLAR 1 DISORDER: ICD-10-CM

## 2025-04-24 DIAGNOSIS — E66.9 OBESITY (BMI 30-39.9): ICD-10-CM

## 2025-04-24 DIAGNOSIS — G47.20 CIRCADIAN RHYTHM SLEEP DISORDER, UNSPECIFIED TYPE: ICD-10-CM

## 2025-04-24 DIAGNOSIS — F43.10 POST TRAUMATIC STRESS DISORDER (PTSD): ICD-10-CM

## 2025-04-24 DIAGNOSIS — F41.1 GENERALIZED ANXIETY DISORDER: Primary | ICD-10-CM

## 2025-04-24 RX ORDER — PROPRANOLOL HYDROCHLORIDE 10 MG/1
10 TABLET ORAL 4 TIMES DAILY PRN
Qty: 120 TABLET | Refills: 1 | Status: SHIPPED | OUTPATIENT
Start: 2025-04-24

## 2025-04-24 RX ORDER — AMITRIPTYLINE HYDROCHLORIDE 100 MG/1
100 TABLET ORAL
Qty: 90 TABLET | Refills: 0 | Status: SHIPPED | OUTPATIENT
Start: 2025-04-24

## 2025-04-24 RX ORDER — TRAZODONE HYDROCHLORIDE 150 MG/1
150 TABLET ORAL
Qty: 90 TABLET | Refills: 0 | Status: SHIPPED | OUTPATIENT
Start: 2025-04-24

## 2025-04-24 RX ORDER — TOPIRAMATE 50 MG/1
50 TABLET, FILM COATED ORAL NIGHTLY
Qty: 90 TABLET | Refills: 0 | Status: SHIPPED | OUTPATIENT
Start: 2025-04-24

## 2025-04-24 RX ORDER — DULOXETIN HYDROCHLORIDE 60 MG/1
120 CAPSULE, DELAYED RELEASE ORAL DAILY
Qty: 180 CAPSULE | Refills: 0 | Status: SHIPPED | OUTPATIENT
Start: 2025-04-24

## 2025-04-24 NOTE — PROGRESS NOTES
Follow Up Office Visit      Patient Name: Shelley Vo  : 1975   MRN: [id number]     Referring Provider: Tracie Nettles PA-C    Chief Complaint:      ICD-10-CM ICD-9-CM   1. Generalized anxiety disorder  F41.1 300.02   2. Bipolar 1 disorder  F31.9 296.7   3. Obesity (BMI 30-39.9)  E66.9 278.00   4. Circadian rhythm sleep disorder, unspecified type  G47.20 327.30   5. Post traumatic stress disorder (PTSD)  F43.10 309.81        History of Present Illness:   Shelley Vo is a 49 y.o. female who is here today for follow up with psychiatric medications.    Subjective      Patient Reports:   My concentration and organization is mildly improved with the dose of adderall adjusted but it is still an issue.  Finally got my EGD.  Found some cancerous cells in my esophagus.  I am seeing someone at the cancer office here in town.    I get so close to a panic attack all the time.  I would like to try an anxiety medication that I can take as needed.  I am super sensitive to things too, especially sounds.  I just try to remove myself from the area or situation  I am extra stressed out.      Review of Systems:   Review of Systems   Psychiatric/Behavioral:  Positive for depressed mood and stress. The patient is nervous/anxious.        Screening Scores:   PHQ-9 : 11  CARMEN-7 : 19    RISK ASSESSMENT:  Patient denies any high risk factors today.    Medications:     Current Outpatient Medications:     alendronate (Fosamax) 70 MG tablet, Take 1 tablet by mouth Every 7 (Seven) Days. For osteopenia, Disp: 12 tablet, Rfl: 4    amitriptyline (ELAVIL) 100 MG tablet, Take 1 tablet by mouth every night at bedtime., Disp: 90 tablet, Rfl: 0    amphetamine-dextroamphetamine XR (ADDERALL XR) 25 MG 24 hr capsule, Take 1 capsule by mouth Every Morning, Disp: 30 capsule, Rfl: 0    cetirizine (zyrTEC) 10 MG tablet, Take 1 tablet by mouth Daily. For allergies, Disp: 90 tablet, Rfl: 3    DULoxetine (CYMBALTA) 60 MG capsule, Take 2 capsules  "by mouth Daily., Disp: 180 capsule, Rfl: 0    fluticasone (FLONASE) 50 MCG/ACT nasal spray, 2 sprays by Each Nare route Daily. For allergies, Disp: 16 g, Rfl: 1    hydroCHLOROthiazide 50 MG tablet, Take 1 tablet by mouth Daily. As needed for swelling, Disp: 30 tablet, Rfl: 1    Multiple Vitamins-Minerals (Multivitamin Gummies Adult) chewable tablet, Chew 2 each Daily., Disp: 180 tablet, Rfl: 3    omeprazole (priLOSEC) 40 MG capsule, Take 1 capsule by mouth Daily. For acid reflux, Disp: 30 capsule, Rfl: 1    oxyCODONE-acetaminophen (PERCOCET)  MG per tablet, Take 1 tablet by mouth 4 (Four) Times a Day., Disp: , Rfl:     polyethylene glycol (MIRALAX) 17 g packet, Take 17 g by mouth Daily. For constipation, Disp: 90 each, Rfl: 0    topiramate (TOPAMAX) 50 MG tablet, Take 1 tablet by mouth Every Night., Disp: 90 tablet, Rfl: 0    traZODone (DESYREL) 150 MG tablet, Take 1 tablet by mouth every night at bedtime., Disp: 90 tablet, Rfl: 0    VITAMIN C, CALCIUM ASCORBATE, PO, Take  by mouth., Disp: , Rfl:     zinc sulfate (ZINCATE) 220 (50 Zn) MG capsule, Take 1 capsule by mouth Daily., Disp: , Rfl:     propranolol (INDERAL) 10 MG tablet, Take 1 tablet by mouth 4 (Four) Times a Day As Needed (anxiety)., Disp: 120 tablet, Rfl: 1    Medication Considerations:  EWA reviewed and appropriate.      Allergies:   Allergies   Allergen Reactions    Levofloxacin Unknown - Low Severity     Other reaction(s): joints hurt post taking    Nsaids Other (See Comments) and Myalgia     Hx ulcers    Other reaction(s): Other (See Comments)    Pt states upset stomach    contraindicated due to stomach ulcersPt states upset stomach    Adhesive Tape Rash     Surgical tape - bruising and rash  Surgical tape - bruising and rash         Results Reviewed:   screeners     Objective     Physical Exam:  Vital Signs:   Vitals:    04/24/25 1309   BP: 134/70   Pulse: 64   SpO2: 97%   Weight: Comment: Pt refused to weigh.   Height: 170.2 cm (67\") "     Body mass index is 37.28 kg/m².     Mental Status Exam:   Hygiene:   good  Cooperation:  Cooperative  Eye Contact:  Good  Psychomotor Behavior:  Restless-fidgeting  Affect:  Appropriate  Mood: anxious  Speech:  Rapid  Thought Process:  Circum  Thought Content:  Mood congruent  Suicidal:  None  Homicidal:  None  Hallucinations:  None  Delusion:  None  Memory:  Intact  Orientation:  Grossly intact  Reliability:  good  Insight:  Good  Judgement:  Fair  Impulse Control:  Fair  Physical/Medical Issues:   ongoing GI issues, cancer cells in esophogus      Assessment / Plan      Visit Diagnosis/Orders Placed This Visit:  Diagnoses and all orders for this visit:    1. Generalized anxiety disorder (Primary)  -     topiramate (TOPAMAX) 50 MG tablet; Take 1 tablet by mouth Every Night.  Dispense: 90 tablet; Refill: 0  -     traZODone (DESYREL) 150 MG tablet; Take 1 tablet by mouth every night at bedtime.  Dispense: 90 tablet; Refill: 0  -     DULoxetine (CYMBALTA) 60 MG capsule; Take 2 capsules by mouth Daily.  Dispense: 180 capsule; Refill: 0  -     amitriptyline (ELAVIL) 100 MG tablet; Take 1 tablet by mouth every night at bedtime.  Dispense: 90 tablet; Refill: 0  -     propranolol (INDERAL) 10 MG tablet; Take 1 tablet by mouth 4 (Four) Times a Day As Needed (anxiety).  Dispense: 120 tablet; Refill: 1    2. Bipolar 1 disorder  -     topiramate (TOPAMAX) 50 MG tablet; Take 1 tablet by mouth Every Night.  Dispense: 90 tablet; Refill: 0  -     DULoxetine (CYMBALTA) 60 MG capsule; Take 2 capsules by mouth Daily.  Dispense: 180 capsule; Refill: 0    3. Obesity (BMI 30-39.9)  -     topiramate (TOPAMAX) 50 MG tablet; Take 1 tablet by mouth Every Night.  Dispense: 90 tablet; Refill: 0    4. Circadian rhythm sleep disorder, unspecified type  -     traZODone (DESYREL) 150 MG tablet; Take 1 tablet by mouth every night at bedtime.  Dispense: 90 tablet; Refill: 0  -     amitriptyline (ELAVIL) 100 MG tablet; Take 1 tablet by mouth  every night at bedtime.  Dispense: 90 tablet; Refill: 0    5. Post traumatic stress disorder (PTSD)  -     traZODone (DESYREL) 150 MG tablet; Take 1 tablet by mouth every night at bedtime.  Dispense: 90 tablet; Refill: 0         Functional Status: Moderate impairment     Prognosis: Good with Ongoing Treatment     Impression/Formulation:  Patient appeared alert and oriented. Patient is receptive to assistance with maintaining a stable lifestyle.  Patient presents with history of     ICD-10-CM ICD-9-CM   1. Generalized anxiety disorder  F41.1 300.02   2. Bipolar 1 disorder  F31.9 296.7   3. Obesity (BMI 30-39.9)  E66.9 278.00   4. Circadian rhythm sleep disorder, unspecified type  G47.20 327.30   5. Post traumatic stress disorder (PTSD)  F43.10 309.81   Patient screened positive for depression based on a PHQ-9 score of 11 on 4/24/2025. Follow-up recommendations include: Continue with medication management and therapy  Patient reports increased family and health stressors which have increased her anxiety and she requests a prn anxiety medication.  She also requests an adjustment to her adhd medication due to brain fog and continued lack of concentration and organization.  Informed patient topiramate could be cause of word finding and brain fog, patient elects to stay on it for now because it can help appetite/cravings/mood.    Treatment Plan:   Continue trazodone,topiramate, duloxetine, amitriptyline  Start propranolol prn for anxiety  Increase adderall xr to 30 mg with next refill.    Patient will continue supportive psychotherapy efforts and medications as indicated. Clinic will obtain release of information for current treatment team for continuity of care as needed. Patient will contact this office, call 911 or present to the nearest emergency room should suicidal or homicidal ideations occur.  Discussed medication options and treatment plan of prescribed medication(s) as well as the risks, benefits, and potential  side effects. Patient ackowledged and verbally consented to continue with current treatment plan and was educated on the importance of compliance with treatment and follow-up appointments.     Follow Up:   Return in about 3 months (around 7/24/2025).        ANDREA Daniels, PMHNP-BC  [facility]     This is electronically signed by ANDREA Daniels, MATEO-BC  [unfilled] 14:57 EDT

## 2025-04-30 ENCOUNTER — INFUSION (OUTPATIENT)
Dept: ONCOLOGY | Facility: HOSPITAL | Age: 50
End: 2025-04-30
Payer: COMMERCIAL

## 2025-04-30 VITALS
DIASTOLIC BLOOD PRESSURE: 83 MMHG | BODY MASS INDEX: 37.06 KG/M2 | WEIGHT: 236.6 LBS | RESPIRATION RATE: 18 BRPM | TEMPERATURE: 96.8 F | HEART RATE: 91 BPM | SYSTOLIC BLOOD PRESSURE: 136 MMHG

## 2025-04-30 DIAGNOSIS — E53.8 B12 DEFICIENCY: Primary | ICD-10-CM

## 2025-04-30 PROCEDURE — 25010000002 CYANOCOBALAMIN PER 1000 MCG: Performed by: INTERNAL MEDICINE

## 2025-04-30 PROCEDURE — 96372 THER/PROPH/DIAG INJ SC/IM: CPT

## 2025-04-30 RX ORDER — CYANOCOBALAMIN 1000 UG/ML
1000 INJECTION, SOLUTION INTRAMUSCULAR; SUBCUTANEOUS ONCE
Status: COMPLETED | OUTPATIENT
Start: 2025-04-30 | End: 2025-04-30

## 2025-04-30 RX ADMIN — CYANOCOBALAMIN 1000 MCG: 1000 INJECTION, SOLUTION INTRAMUSCULAR at 13:27

## 2025-05-07 ENCOUNTER — OFFICE VISIT (OUTPATIENT)
Dept: BEHAVIORAL HEALTH | Facility: CLINIC | Age: 50
End: 2025-05-07
Payer: COMMERCIAL

## 2025-05-07 DIAGNOSIS — F32.A MODERATE DEPRESSIVE DISORDER: Primary | ICD-10-CM

## 2025-05-07 PROCEDURE — 90834 PSYTX W PT 45 MINUTES: CPT | Performed by: SOCIAL WORKER

## 2025-05-07 NOTE — PROGRESS NOTES
"     Follow Up  Adult Note     Date:2025   Patient Name: Shelley Vo  : 1975   MRN: [id number]   Time IN: 11:00 am    Time OUT: 11:45 am     Referring Provider: Tracie Nettles PA-C    Chief Complaint:      ICD-10-CM ICD-9-CM   1. Moderate depressive disorder  F32.A 311        History of Present Illness:   Shelley Vo is a 49 y.o. female who presents to clinic today for Psychotherapy.     - Lázaro   Daughter - Marissa 26 y/o   Son-in-law - Max   Son - Emil 30 y/o (by adoption)   Son - Max 9 y/o   Daughter - Brittani 8 y/o   Two grand daughters - Winter 7 1/2 and Nicky 3 1/2  One grand son - Destiny Yanez 3 y/o son (Emil's son)    Better than last time  Settling back into being at home  Wants to move to [place]  Brother, daughter, grandchildren  Lázaro is interested in moving    Marissa is going to divorce her  boyfriend  He's paying the rent for Marissa to live in the current apartment  She's going to represent herself pro-kaila    Lázaro still aggravates me  He interrupts me a lot  He does not seem to listening when I talk  Makes little eye contact  Not new behavior, But noticing now   He forgets conversations   He hasn't changed but has gotten worse  I think I'm jealous of his conversation and relationship with his mother  He says his feelings are hurt by how I talk to him    No eye contact  Does not appear to be listening  Does not appear to remember conversations  Does not appear to be responsive to previous conversations  His mother lives with us  He has not been to the dentist in years  He has not been to the doctor in years  He works from home as an options  /   Client's mother-in-law and client are the chief bread winners in the home  -baby sitting  -helping with taxi service  -care giving for a senior citizen  -graphing design    Been together over 10 years  Client seems to have outgrown her   \"I don't even want a hug any more\"  \"I don't want him to " "touch me anymore\"  \"I want him to brush his teeth, shave, and cologne\"  \"Seeing him grosses me out\"    We want to be intimidate but he repulses me  -no porn  -no beards  -not smoking    I want him to put forth an effort his health  I want him to put forth an effort in the marriage  I want him to put forth an effort with his appearance  Over promise and under deliver to our children  I have to cover his mistakes  I'd rather he fight than have no passion       Subjective     Triggers: (Persons/Places/Things/Events/Thought/Emotions): Marital discord     Medications:   Current Outpatient Medications:     alendronate (Fosamax) 70 MG tablet, Take 1 tablet by mouth Every 7 (Seven) Days. For osteopenia, Disp: 12 tablet, Rfl: 4    amitriptyline (ELAVIL) 100 MG tablet, Take 1 tablet by mouth every night at bedtime., Disp: 90 tablet, Rfl: 0    amphetamine-dextroamphetamine XR (ADDERALL XR) 25 MG 24 hr capsule, Take 1 capsule by mouth Every Morning, Disp: 30 capsule, Rfl: 0    cetirizine (zyrTEC) 10 MG tablet, Take 1 tablet by mouth Daily. For allergies, Disp: 90 tablet, Rfl: 3    DULoxetine (CYMBALTA) 60 MG capsule, Take 2 capsules by mouth Daily., Disp: 180 capsule, Rfl: 0    fluticasone (FLONASE) 50 MCG/ACT nasal spray, 2 sprays by Each Nare route Daily. For allergies, Disp: 16 g, Rfl: 1    hydroCHLOROthiazide 50 MG tablet, Take 1 tablet by mouth Daily. As needed for swelling, Disp: 30 tablet, Rfl: 1    Multiple Vitamins-Minerals (Multivitamin Gummies Adult) chewable tablet, Chew 2 each Daily., Disp: 180 tablet, Rfl: 3    omeprazole (priLOSEC) 40 MG capsule, Take 1 capsule by mouth Daily. For acid reflux, Disp: 30 capsule, Rfl: 1    oxyCODONE-acetaminophen (PERCOCET)  MG per tablet, Take 1 tablet by mouth 4 (Four) Times a Day., Disp: , Rfl:     polyethylene glycol (MIRALAX) 17 g packet, Take 17 g by mouth Daily. For constipation, Disp: 90 each, Rfl: 0    propranolol (INDERAL) 10 MG tablet, Take 1 tablet by mouth 4 " (Four) Times a Day As Needed (anxiety)., Disp: 120 tablet, Rfl: 1    topiramate (TOPAMAX) 50 MG tablet, Take 1 tablet by mouth Every Night., Disp: 90 tablet, Rfl: 0    traZODone (DESYREL) 150 MG tablet, Take 1 tablet by mouth every night at bedtime., Disp: 90 tablet, Rfl: 0    VITAMIN C, CALCIUM ASCORBATE, PO, Take  by mouth., Disp: , Rfl:     zinc sulfate (ZINCATE) 220 (50 Zn) MG capsule, Take 1 capsule by mouth Daily., Disp: , Rfl:     Allergies:   Allergies   Allergen Reactions    Levofloxacin Unknown - Low Severity     Other reaction(s): joints hurt post taking    Nsaids Other (See Comments) and Myalgia     Hx ulcers    Other reaction(s): Other (See Comments)    Pt states upset stomach    contraindicated due to stomach ulcersPt states upset stomach    Adhesive Tape Rash     Surgical tape - bruising and rash  Surgical tape - bruising and rash       Objective     MENTAL STATUS EXAM   General Appearance:  Cleanly groomed and dressed  Eye Contact:  Good eye contact  Attitude:  Cooperative  Motor Activity:  Normal gait, posture  Muscle Strength:  Normal  Speech:  Normal rate, tone, volume  Language:  Spontaneous  Mood and affect:  Depressed and frustrated  Thought Process:  Logical and goal-directed  Associations/ Thought Content:  No delusions  Hallucinations:  None  Suicidal Ideations:  Not present  Homicidal Ideation:  Not present  Sensorium:  Alert and clear  Orientation:  Person, place, time and situation  Immediate Recall, Recent, and Remote Memory:  Intact  Attention Span/ Concentration:  Good  Fund of Knowledge:  Appropriate for age and educational level  Intellectual Functioning:  Above average  Insight:  Good  Judgement:  Good  Reliability:  Good  Impulse Control:  Good     SUICIDE RISK ASSESSMENT/CSSRS:  1. Does patient have thoughts of suicide? no  2. Does patient have intent for suicide? no  3. Does patient have a current plan for suicide? no    Assessment / Plan      Visit Diagnosis/Orders Placed This  Visit:    ICD-10-CM ICD-9-CM   1. Moderate depressive disorder  F32.A 311      PLAN:  Safety: No acute safety concerns  Risk Assessment: Risk of self-harm acutely is low. Risk of self-harm chronically is also low, but could be further elevated in the event of treatment noncompliance and/or AODA.    Treatment Plan/ Short and Long Term Goals: Continue supportive psychotherapy efforts and medications as indicated. Treatment and medication options discussed during today's visit. Patient ackowledged and verbally consented to continue with current treatment plan and was educated on the importance of compliance with treatment and follow-up appointments. Patient seems reasonably able to adhere to treatment plan.      Assisted Patient in processing above session content; acknowledged and normalized patient’s thoughts, feelings, and concerns.  Rationalized patient thought process regarding Marital counseling / role-modeling / collaborative efforts .      Allowed Patient to freely discuss issues  without interruption or judgement with unconditional positive regard, active listening skills, and empathy. Therapist provided a safe, confidential environment to facilitate the development of a positive therapeutic relationship and encouraged open, honest communication. Assisted Patient in identifying risk factors which would indicate the need for higher level of care including thoughts to harm self or others and/or self-harming behavior and encouraged Patient to contact this office, call 911, or present to the nearest emergency room should any of these events occur. Discussed crisis intervention services and means to access. Patient adamantly and convincingly denies current suicidal or homicidal ideation or perceptual disturbance. Assisted Patient in processing session content; acknowledged and normalized Patient’s thoughts, feelings, and concerns by utilizing a person-centered approach in efforts to build appropriate rapport and a  positive therapeutic relationship with open and honest communication.     Follow Up:   Return in about 1 week (around 5/14/2025) for Psychoterapy.    MICHELINE GrandeW, KLPC [facility]

## 2025-05-15 DIAGNOSIS — F90.2 ATTENTION DEFICIT HYPERACTIVITY DISORDER (ADHD), COMBINED TYPE: ICD-10-CM

## 2025-05-15 RX ORDER — DEXTROAMPHETAMINE SACCHARATE, AMPHETAMINE ASPARTATE MONOHYDRATE, DEXTROAMPHETAMINE SULFATE AND AMPHETAMINE SULFATE 6.25; 6.25; 6.25; 6.25 MG/1; MG/1; MG/1; MG/1
25 CAPSULE, EXTENDED RELEASE ORAL EVERY MORNING
Qty: 30 CAPSULE | Refills: 0 | Status: SHIPPED | OUTPATIENT
Start: 2025-05-15

## 2025-05-21 DIAGNOSIS — F41.1 GENERALIZED ANXIETY DISORDER: ICD-10-CM

## 2025-05-21 RX ORDER — PROPRANOLOL HYDROCHLORIDE 10 MG/1
10 TABLET ORAL
Qty: 120 TABLET | Refills: 1 | OUTPATIENT
Start: 2025-05-21

## 2025-05-27 ENCOUNTER — INFUSION (OUTPATIENT)
Dept: ONCOLOGY | Facility: HOSPITAL | Age: 50
End: 2025-05-27
Payer: COMMERCIAL

## 2025-05-27 VITALS
WEIGHT: 235.4 LBS | HEART RATE: 77 BPM | DIASTOLIC BLOOD PRESSURE: 85 MMHG | BODY MASS INDEX: 36.87 KG/M2 | SYSTOLIC BLOOD PRESSURE: 137 MMHG | TEMPERATURE: 97.6 F | RESPIRATION RATE: 18 BRPM

## 2025-05-27 DIAGNOSIS — E53.8 B12 DEFICIENCY: Primary | ICD-10-CM

## 2025-05-27 DIAGNOSIS — D50.8 IRON DEFICIENCY ANEMIA SECONDARY TO INADEQUATE DIETARY IRON INTAKE: ICD-10-CM

## 2025-05-27 PROCEDURE — 25010000002 CYANOCOBALAMIN PER 1000 MCG: Performed by: INTERNAL MEDICINE

## 2025-05-27 PROCEDURE — 96372 THER/PROPH/DIAG INJ SC/IM: CPT

## 2025-05-27 PROCEDURE — 36415 COLL VENOUS BLD VENIPUNCTURE: CPT

## 2025-05-27 RX ORDER — CYANOCOBALAMIN 1000 UG/ML
1000 INJECTION, SOLUTION INTRAMUSCULAR; SUBCUTANEOUS ONCE
Status: COMPLETED | OUTPATIENT
Start: 2025-05-27 | End: 2025-05-27

## 2025-05-27 RX ADMIN — CYANOCOBALAMIN 1000 MCG: 1000 INJECTION, SOLUTION INTRAMUSCULAR; SUBCUTANEOUS at 14:20

## 2025-05-29 LAB
BASOPHILS # BLD AUTO: 0.01 10*3/MM3 (ref 0–0.2)
BASOPHILS NFR BLD AUTO: 0.2 % (ref 0–1.5)
EOSINOPHIL # BLD AUTO: 0.04 10*3/MM3 (ref 0–0.4)
EOSINOPHIL NFR BLD AUTO: 0.8 % (ref 0.3–6.2)
ERYTHROCYTE [DISTWIDTH] IN BLOOD BY AUTOMATED COUNT: 14.7 % (ref 12.3–15.4)
FERRITIN SERPL-MCNC: 50 NG/ML (ref 13–150)
FOLATE SERPL-MCNC: 5.97 NG/ML (ref 4.78–24.2)
HCT VFR BLD AUTO: 34.3 % (ref 34–46.6)
HCYS SERPL-SCNC: 16.9 UMOL/L (ref 0–14.5)
HGB BLD-MCNC: 11.1 G/DL (ref 12–15.9)
IMM GRANULOCYTES # BLD AUTO: 0 10*3/MM3 (ref 0–0.05)
IMM GRANULOCYTES NFR BLD AUTO: 0 % (ref 0–0.5)
IRON SATN MFR SERPL: 23 % (ref 20–50)
IRON SERPL-MCNC: 95 MCG/DL (ref 37–145)
LYMPHOCYTES # BLD AUTO: 2.39 10*3/MM3 (ref 0.7–3.1)
LYMPHOCYTES NFR BLD AUTO: 49.9 % (ref 19.6–45.3)
MCH RBC QN AUTO: 29.4 PG (ref 26.6–33)
MCHC RBC AUTO-ENTMCNC: 32.4 G/DL (ref 31.5–35.7)
MCV RBC AUTO: 91 FL (ref 79–97)
METHYLMALONATE SERPL-SCNC: 274 NMOL/L (ref 0–378)
MONOCYTES # BLD AUTO: 0.39 10*3/MM3 (ref 0.1–0.9)
MONOCYTES NFR BLD AUTO: 8.1 % (ref 5–12)
NEUTROPHILS # BLD AUTO: 1.96 10*3/MM3 (ref 1.7–7)
NEUTROPHILS NFR BLD AUTO: 41 % (ref 42.7–76)
NRBC BLD AUTO-RTO: 0 /100 WBC (ref 0–0.2)
PLATELET # BLD AUTO: 261 10*3/MM3 (ref 140–450)
RBC # BLD AUTO: 3.77 10*6/MM3 (ref 3.77–5.28)
TIBC SERPL-MCNC: 411 MCG/DL
UIBC SERPL-MCNC: 316 MCG/DL (ref 112–346)
VIT B12 SERPL-MCNC: 471 PG/ML (ref 211–946)
WBC # BLD AUTO: 4.79 10*3/MM3 (ref 3.4–10.8)

## 2025-06-03 ENCOUNTER — OFFICE VISIT (OUTPATIENT)
Dept: ONCOLOGY | Facility: CLINIC | Age: 50
End: 2025-06-03
Payer: COMMERCIAL

## 2025-06-03 VITALS
HEIGHT: 67 IN | RESPIRATION RATE: 18 BRPM | DIASTOLIC BLOOD PRESSURE: 82 MMHG | HEART RATE: 89 BPM | TEMPERATURE: 97.5 F | OXYGEN SATURATION: 96 % | BODY MASS INDEX: 36.88 KG/M2 | SYSTOLIC BLOOD PRESSURE: 155 MMHG | WEIGHT: 235 LBS

## 2025-06-03 DIAGNOSIS — E53.8 B12 DEFICIENCY: ICD-10-CM

## 2025-06-03 DIAGNOSIS — D50.8 OTHER IRON DEFICIENCY ANEMIA: Primary | ICD-10-CM

## 2025-06-03 RX ORDER — CYANOCOBALAMIN 1000 UG/ML
1000 INJECTION, SOLUTION INTRAMUSCULAR; SUBCUTANEOUS ONCE
OUTPATIENT
Start: 2025-06-25

## 2025-06-03 RX ORDER — CYANOCOBALAMIN 1000 UG/ML
1000 INJECTION, SOLUTION INTRAMUSCULAR; SUBCUTANEOUS ONCE
OUTPATIENT
Start: 2025-12-10

## 2025-06-03 RX ORDER — CYANOCOBALAMIN 1000 UG/ML
1000 INJECTION, SOLUTION INTRAMUSCULAR; SUBCUTANEOUS ONCE
OUTPATIENT
Start: 2025-11-12

## 2025-06-03 RX ORDER — CYANOCOBALAMIN 1000 UG/ML
1000 INJECTION, SOLUTION INTRAMUSCULAR; SUBCUTANEOUS ONCE
OUTPATIENT
Start: 2025-08-20

## 2025-06-03 RX ORDER — CYANOCOBALAMIN 1000 UG/ML
1000 INJECTION, SOLUTION INTRAMUSCULAR; SUBCUTANEOUS ONCE
OUTPATIENT
Start: 2025-07-23

## 2025-06-03 RX ORDER — CYANOCOBALAMIN 1000 UG/ML
1000 INJECTION, SOLUTION INTRAMUSCULAR; SUBCUTANEOUS ONCE
OUTPATIENT
Start: 2025-09-17

## 2025-06-03 RX ORDER — CYANOCOBALAMIN 1000 UG/ML
1000 INJECTION, SOLUTION INTRAMUSCULAR; SUBCUTANEOUS ONCE
OUTPATIENT
Start: 2025-10-15

## 2025-06-03 NOTE — LETTER
"Yandy 3, 2025     Tracie Nettles PA-C  [address] [place] [address]    Patient: Shelley Vo   YOB: 1975   Date of Visit: 6/3/2025     Dear Tracie Nettles PA-C:       Thank you for referring Shelley Vo to me for evaluation. Below are the relevant portions of my assessment and plan of care.    If you have questions, please do not hesitate to call me. I look forward to following Shelley along with you.         Sincerely,        Alek Méndez MD        CC: Jesse Ortiz, ANDREA Hoff, [facility]    Alek Méndez MD  06/03/25 1345  Sign when Signing Visit  CHIEF COMPLAINT: No new somatic complaints from prior anemia    Problem List:  Oncology/Hematology History Overview Note   1.  Anemia in the face of history of gastric bypass  2.  Chronic biliary pancreatitis    -2/28/2025 [facility] hematology oncology consultation Dr. Chinedu Cherry [facility] indicates patient has longstanding history of iron deficiency anemia felt to be related to gastric bypass surgery originally 2002 along with bleeding ulcers that required revision of gastric bypass twice after that.  Upper endoscopy 12/6/2024 told that she had a gastric ulcer, hiatal hernia, Crowell's esophagus and possible fistula.  Denies melena or hematochezia per his note.  Has a history of colon polyps and last colonoscopy was several years ago.  Does not tolerate oral iron as they \"go right through her\".  Had previously seen hematology with his group, .  Received Feraheme 2014 and 2018 and Venofer in late 2020, late 2021 last dose 9/14/2021.  Had not been seen by their clinic since July 2021 prior to that visit of this day.  As of that junction hemoglobin 9.4 MCV 91.3 ferritin 5.1 iron 27 saturation 6% total iron binding capacity 438.  He plans to administer IV iron again and check B12 and folate in light of gastric bypass surgery.  He also referred to GI for repeat EGD and colonoscopy.  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  He plan to see her back in 2 months.  B12 that day was low at 143 and folic acid normal 8.    -4/1/2025 [facility] consult: I reviewed the above data with the patient.  She has started B12 weekly and was given 3 doses of Venofer within the last month in [place] but wants to get the rest of her care hematologically here in [place].  She needs an EGD and colonoscopy which I will set up with Dr. William.  She gets her fourth of 4 weekly B12 injections and then goes to monthly which we will give here in [place] and I will repeat her iron panel, B12, methylmalonic acid, homocystine, folic acid, CBC early June.    - 5/5/2025 Dr. 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  Jael-en-Y anatomy.  Normal colonoscopy.    - 5/27/2025 hemoglobin up to 11.1.  MCV 91.  Unremarkable CBC otherwise.  Ferritin 50.  Iron 95.  Saturation normal 23%.  Total iron binding capacity 411.  Methylmalonic acid normal 274.  B12 normal 471.  Folic acid normal 5.97.  Homocystine inconsequentially elevated 16.9 upper limit of normal 14.5    - 6/3/2025 [facility]ology follow-up: On B12 and after prior IV iron, hemoglobin rising.  No iron deficiency presently.  No significant abnormalities on EGD and colonoscopy with Dr. Contreras.  Will get repeat B12 and iron panel and ferritin as well as CBC prior to return to my nurse practitioner in 3 months.  Source of all this is likely due to her gastric bypass and will likely need more IV iron in the future.     Iron deficiency anemia   10/23/2020 Initial Diagnosis    Iron deficiency anemia     B12 deficiency   4/1/2025 Initial Diagnosis    B12 deficiency     4/1/2025 -  Chemotherapy    OP SUPPORTIVE Cyanocobalamin (Vitamin B12) Q28D         HISTORY OF PRESENT ILLNESS:  The patient is a  "49 y.o. female, here for follow up on management of multifactorial anemia likely due to gastric bypass status post repeat EGD and colonoscopy    Past Medical History:   Diagnosis Date   • Parathyroid adenoma 2018   • Stricture of bile duct 2023   • Thyroid nodule 10/24/2018   • Vitamin D deficiency 2017     Past Surgical History:   Procedure Laterality Date   • ABDOMINAL SURGERY      due to ulcers   •  SECTION     • COLON RESECTION     • GALLBLADDER SURGERY     • GASTRIC BYPASS         Allergies   Allergen Reactions   • Levofloxacin Unknown - Low Severity     Other reaction(s): joints hurt post taking   • Nsaids Other (See Comments) and Myalgia     Hx ulcers    Other reaction(s): Other (See Comments)    Pt states upset stomach    contraindicated due to stomach ulcersPt states upset stomach   • Adhesive Tape Rash     Surgical tape - bruising and rash  Surgical tape - bruising and rash         Family History and Social History reviewed and changed as necessary    REVIEW OF SYSTEM:   No new somatic complaints    PHYSICAL EXAM:  No jaundice icterus or pallor.  No respiratory distress.    Vitals:    25 1309   BP: 155/82   Pulse: 89   Resp: 18   Temp: 97.5 °F (36.4 °C)   SpO2: 96%   Weight: 107 kg (235 lb)   Height: 170.2 cm (67\")     Vitals:    25 1309   PainSc: 0-No pain          ECOG score: 0           Vitals reviewed.    ECOG: (0) Fully Active - Able to Carry On All Pre-disease Performance Without Restriction    Lab Results   Component Value Date    HGB 11.1 (L) 2025    HCT 34.3 2025    MCV 91.0 2025     2025    WBC 4.79 2025    NEUTROABS 1.96 2025    LYMPHSABS 2.39 2025    MONOSABS 0.39 2025    EOSABS 0.04 2025    BASOSABS 0.01 2025       Lab Results   Component Value Date    GLUCOSE 79 2024    BUN 18 2024    CREATININE 0.73 2024     2024    K 4.6 2024     2024    " "CO2 22 05/14/2024    CALCIUM 9.1 05/14/2024    PROTEINTOT 6.7 05/14/2024    ALBUMIN 3.9 05/14/2024    BILITOT <0.2 05/14/2024    ALKPHOS 157 (H) 05/14/2024    AST 25 05/14/2024    ALT 15 05/14/2024             ASSESSMENT & PLAN:  1.  Anemia in the face of history of gastric bypass  2.  Chronic biliary pancreatitis    -2/28/2025 [facility] hematology oncology consultation Dr. Chinedu Cherry [facility] indicates patient has longstanding history of iron deficiency anemia felt to be related to gastric bypass surgery originally 2002 along with bleeding ulcers that required revision of gastric bypass twice after that.  Upper endoscopy 12/6/2024 told that she had a gastric ulcer, hiatal hernia, Crowell's esophagus and possible fistula.  Denies melena or hematochezia per his note.  Has a history of colon polyps and last colonoscopy was several years ago.  Does not tolerate oral iron as they \"go right through her\".  Had previously seen hematology with his group, .  Received Feraheme 2014 and 2018 and Venofer in late 2020, late 2021 last dose 9/14/2021.  Had not been seen by their clinic since July 2021 prior to that visit of this day.  As of that junction hemoglobin 9.4 MCV 91.3 ferritin 5.1 iron 27 saturation 6% total iron binding capacity 438.  He plans to administer IV iron again and check B12 and folate in light of gastric bypass surgery.  He also referred to GI for repeat EGD and colonoscopy.  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  He plan to see her back in 2 months.  B12 that day was low at 143 and folic acid normal 8.    -4/1/2025 [facility] hematology consult: I reviewed the above data with the patient.  She has started B12 weekly and was given 3 doses of Venofer within the last month in [place] but wants to get the rest of her care hematologically " here in [place].  She needs an EGD and colonoscopy which I will set up with Dr. William.  She gets her fourth of 4 weekly B12 injections and then goes to monthly which we will give here in [place] and I will repeat her iron panel, B12, methylmalonic acid, homocystine, folic acid, CBC early June.    - 5/5/2025 Dr. 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  Jael-en-Y anatomy.  Normal colonoscopy.    - 5/27/2025 hemoglobin up to 11.1.  MCV 91.  Unremarkable CBC otherwise.  Ferritin 50.  Iron 95.  Saturation normal 23%.  Total iron binding capacity 411.  Methylmalonic acid normal 274.  B12 normal 471.  Folic acid normal 5.97.  Homocystine inconsequentially elevated 16.9 upper limit of normal 14.5    - 6/3/2025 [facility] hematology follow-up: On B12 and after prior IV iron, hemoglobin rising.  No iron deficiency presently.  No significant abnormalities on EGD and colonoscopy with Dr. Contreras.  Will get repeat B12 and iron panel and ferritin as well as CBC prior to return to my nurse practitioner in 3 months.  Source of all this is likely due to her gastric bypass and will likely need more IV iron in the future.    Total time of care today inclusive of time spent today prior to patient's arrival reviewing interval data and during visit translating patient putting forth a plan as outlined in after visit signing orders for ongoing B12 therapy took 50 minutes patient care time throughout the day inclusive of review of endoscopy results from  Alek Méndez MD    06/03/2025

## 2025-06-03 NOTE — PROGRESS NOTES
"CHIEF COMPLAINT: No new somatic complaints from prior anemia    Problem List:  Oncology/Hematology History Overview Note   1.  Anemia in the face of history of gastric bypass  2.  Chronic biliary pancreatitis    -2/28/2025 [facility] hematology oncology consultation Dr. Chinedu Cherry [facility] indicates patient has longstanding history of iron deficiency anemia felt to be related to gastric bypass surgery originally 2002 along with bleeding ulcers that required revision of gastric bypass twice after that.  Upper endoscopy 12/6/2024 told that she had a gastric ulcer, hiatal hernia, Crowell's esophagus and possible fistula.  Denies melena or hematochezia per his note.  Has a history of colon polyps and last colonoscopy was several years ago.  Does not tolerate oral iron as they \"go right through her\".  Had previously seen hematology with his group, .  Received Feraheme 2014 and 2018 and Venofer in late 2020, late 2021 last dose 9/14/2021.  Had not been seen by their clinic since July 2021 prior to that visit of this day.  As of that junction hemoglobin 9.4 MCV 91.3 ferritin 5.1 iron 27 saturation 6% total iron binding capacity 438.  He plans to administer IV iron again and check B12 and folate in light of gastric bypass surgery.  He also referred to GI for repeat EGD and colonoscopy.  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  He plan to see her back in 2 months.  B12 that day was low at 143 and folic acid normal 8.    -4/1/2025 [facility] hematology consult: I reviewed the above data with the patient.  She has started B12 weekly and was given 3 doses of Venofer within the last month in [place] but wants to get the rest of her care hematologically here in [place].  She needs an EGD and colonoscopy which I will set up with Dr. William.  She gets her fourth " of 4 weekly B12 injections and then goes to monthly which we will give here in [place] and I will repeat her iron panel, B12, methylmalonic acid, homocystine, folic acid, CBC early .    - 2025 Dr. 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  Jael-en-Y anatomy.  Normal colonoscopy.    - 2025 hemoglobin up to 11.1.  MCV 91.  Unremarkable CBC otherwise.  Ferritin 50.  Iron 95.  Saturation normal 23%.  Total iron binding capacity 411.  Methylmalonic acid normal 274.  B12 normal 471.  Folic acid normal 5.97.  Homocystine inconsequentially elevated 16.9 upper limit of normal 14.5    - 6/3/2025 [facility] hematology follow-up: On B12 and after prior IV iron, hemoglobin rising.  No iron deficiency presently.  No significant abnormalities on EGD and colonoscopy with Dr. Contreras.  Will get repeat B12 and iron panel and ferritin as well as CBC prior to return to my nurse practitioner in 3 months.  Source of all this is likely due to her gastric bypass and will likely need more IV iron in the future.     Iron deficiency anemia   10/23/2020 Initial Diagnosis    Iron deficiency anemia     B12 deficiency   2025 Initial Diagnosis    B12 deficiency     2025 -  Chemotherapy    OP SUPPORTIVE Cyanocobalamin (Vitamin B12) Q28D         HISTORY OF PRESENT ILLNESS:  The patient is a 49 y.o. female, here for follow up on management of multifactorial anemia likely due to gastric bypass status post repeat EGD and colonoscopy    Past Medical History:   Diagnosis Date    Parathyroid adenoma 2018    Stricture of bile duct 2023    Thyroid nodule 10/24/2018    Vitamin D deficiency 2017     Past Surgical History:   Procedure Laterality Date    ABDOMINAL SURGERY      due to ulcers     SECTION      COLON RESECTION      GALLBLADDER SURGERY      GASTRIC BYPASS         Allergies   Allergen Reactions    Levofloxacin Unknown - Low Severity     Other  "reaction(s): joints hurt post taking    Nsaids Other (See Comments) and Myalgia     Hx ulcers    Other reaction(s): Other (See Comments)    Pt states upset stomach    contraindicated due to stomach ulcersPt states upset stomach    Adhesive Tape Rash     Surgical tape - bruising and rash  Surgical tape - bruising and rash         Family History and Social History reviewed and changed as necessary    REVIEW OF SYSTEM:   No new somatic complaints    PHYSICAL EXAM:  No jaundice icterus or pallor.  No respiratory distress.    Vitals:    06/03/25 1309   BP: 155/82   Pulse: 89   Resp: 18   Temp: 97.5 °F (36.4 °C)   SpO2: 96%   Weight: 107 kg (235 lb)   Height: 170.2 cm (67\")     Vitals:    06/03/25 1309   PainSc: 0-No pain          ECOG score: 0           Vitals reviewed.    ECOG: (0) Fully Active - Able to Carry On All Pre-disease Performance Without Restriction    Lab Results   Component Value Date    HGB 11.1 (L) 05/27/2025    HCT 34.3 05/27/2025    MCV 91.0 05/27/2025     05/27/2025    WBC 4.79 05/27/2025    NEUTROABS 1.96 05/27/2025    LYMPHSABS 2.39 05/27/2025    MONOSABS 0.39 05/27/2025    EOSABS 0.04 05/27/2025    BASOSABS 0.01 05/27/2025       Lab Results   Component Value Date    GLUCOSE 79 05/14/2024    BUN 18 05/14/2024    CREATININE 0.73 05/14/2024     05/14/2024    K 4.6 05/14/2024     05/14/2024    CO2 22 05/14/2024    CALCIUM 9.1 05/14/2024    PROTEINTOT 6.7 05/14/2024    ALBUMIN 3.9 05/14/2024    BILITOT <0.2 05/14/2024    ALKPHOS 157 (H) 05/14/2024    AST 25 05/14/2024    ALT 15 05/14/2024             ASSESSMENT & PLAN:  1.  Anemia in the face of history of gastric bypass  2.  Chronic biliary pancreatitis    -2/28/2025 [facility] hematology oncology consultation Dr. Chinedu Cherry [facility] indicates patient has longstanding history of iron deficiency anemia felt to be related to gastric bypass surgery originally 2002 along with bleeding ulcers that required " "revision of gastric bypass twice after that.  Upper endoscopy 12/6/2024 told that she had a gastric ulcer, hiatal hernia, Crowell's esophagus and possible fistula.  Denies melena or hematochezia per his note.  Has a history of colon polyps and last colonoscopy was several years ago.  Does not tolerate oral iron as they \"go right through her\".  Had previously seen hematology with his group, .  Received Feraheme 2014 and 2018 and Venofer in late 2020, late 2021 last dose 9/14/2021.  Had not been seen by their clinic since July 2021 prior to that visit of this day.  As of that junction hemoglobin 9.4 MCV 91.3 ferritin 5.1 iron 27 saturation 6% total iron binding capacity 438.  He plans to administer IV iron again and check B12 and folate in light of gastric bypass surgery.  He also referred to GI for repeat EGD and colonoscopy.  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  He plan to see her back in 2 months.  B12 that day was low at 143 and folic acid normal 8.    -4/1/2025 [facility] hematology consult: I reviewed the above data with the patient.  She has started B12 weekly and was given 3 doses of Venofer within the last month in [place] but wants to get the rest of her care hematologically here in [place].  She needs an EGD and colonoscopy which I will set up with Dr. William.  She gets her fourth of 4 weekly B12 injections and then goes to monthly which we will give here in [place] and I will repeat her iron panel, B12, methylmalonic acid, homocystine, folic acid, CBC early June.    - 5/5/2025 Dr. 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  Jael-en-Y anatomy.  Normal colonoscopy.    - 5/27/2025 hemoglobin up to 11.1.  MCV 91.  Unremarkable CBC otherwise.  Ferritin 50.  Iron 95.  Saturation normal 23%.  Total iron binding " capacity 411.  Methylmalonic acid normal 274.  B12 normal 471.  Folic acid normal 5.97.  Homocystine inconsequentially elevated 16.9 upper limit of normal 14.5    - 6/3/2025 [facility] hematology follow-up: On B12 and after prior IV iron, hemoglobin rising.  No iron deficiency presently.  No significant abnormalities on EGD and colonoscopy with Dr. Contreras.  Will get repeat B12 and iron panel and ferritin as well as CBC prior to return to my nurse practitioner in 3 months.  Source of all this is likely due to her gastric bypass and will likely need more IV iron in the future.    Total time of care today inclusive of time spent today prior to patient's arrival reviewing interval data and during visit translating patient putting forth a plan as outlined in after visit signing orders for ongoing B12 therapy took 50 minutes patient care time throughout the day inclusive of review of endoscopy results from  Alek Méndez MD    06/03/2025

## 2025-06-04 ENCOUNTER — OFFICE VISIT (OUTPATIENT)
Dept: BEHAVIORAL HEALTH | Facility: CLINIC | Age: 50
End: 2025-06-04
Payer: COMMERCIAL

## 2025-06-04 DIAGNOSIS — F32.A MODERATE DEPRESSIVE DISORDER: Primary | ICD-10-CM

## 2025-06-04 DIAGNOSIS — F43.10 POST TRAUMATIC STRESS DISORDER (PTSD): ICD-10-CM

## 2025-06-04 DIAGNOSIS — F90.2 ATTENTION DEFICIT HYPERACTIVITY DISORDER (ADHD), COMBINED TYPE: ICD-10-CM

## 2025-06-04 PROCEDURE — 1159F MED LIST DOCD IN RCRD: CPT | Performed by: SOCIAL WORKER

## 2025-06-04 PROCEDURE — 1160F RVW MEDS BY RX/DR IN RCRD: CPT | Performed by: SOCIAL WORKER

## 2025-06-04 PROCEDURE — 90834 PSYTX W PT 45 MINUTES: CPT | Performed by: SOCIAL WORKER

## 2025-06-04 NOTE — PROGRESS NOTES
Follow Up  Adult Note     Date:2025   Patient Name: Shelley Vo  : 1975   MRN: [id number]   Time IN: 10:55 am    Time OUT:  11:30 am    Referring Provider: Tracie Nettles PA-C    Chief Complaint:      ICD-10-CM ICD-9-CM   1. Moderate depressive disorder  F32.A 311   2. Attention deficit hyperactivity disorder (ADHD), combined type  F90.2 314.01   3. Post traumatic stress disorder (PTSD)  F43.10 309.81        History of Present Illness:   Shelley oV is a 49 y.o. female who presents to clinic today for Psychotherapy.     - Lázaro   Daughter - Marissa 28 y/o   Son-in-law - Max   Son - Emil 32 y/o (by adoption)   Son - Bakari 10 y/o   Daughter - Brittani 7 y/o   Two grand daughters - Winter 7 1/2 and Nicky 3 1/2  One grand son - Destiny Yanez 3 y/o son (Emil's son)    Doing okay  Went to [facility] to see Marissa, her daughter  She's looking for a full-time job  Grand daughters will the summer with me  Daughter's  has moved out    No changes in marital issues  Lázaro is supportive of Marissa  He's glad for the girls to be with us  (Marissa is not his biological daughter)   He doesn't hear me  Frustrated with  not being as helpful  Frustrated with  not making enough money  Frustrated with carrying the needs of the family  Frustrated with ADHD / OCD tendencies kicking in  Frustrated dealing with her health    *I have to make life-style changes    Subjective     Triggers: (Persons/Places/Things/Events/Thought/Emotions): Frustration     Medications:   Current Outpatient Medications:     alendronate (Fosamax) 70 MG tablet, Take 1 tablet by mouth Every 7 (Seven) Days. For osteopenia, Disp: 12 tablet, Rfl: 4    amitriptyline (ELAVIL) 100 MG tablet, Take 1 tablet by mouth every night at bedtime., Disp: 90 tablet, Rfl: 0    amphetamine-dextroamphetamine XR (ADDERALL XR) 25 MG 24 hr capsule, Take 1 capsule by mouth Every Morning, Disp: 30 capsule, Rfl: 0    cetirizine (zyrTEC) 10 MG  tablet, Take 1 tablet by mouth Daily. For allergies, Disp: 90 tablet, Rfl: 3    DULoxetine (CYMBALTA) 60 MG capsule, Take 2 capsules by mouth Daily., Disp: 180 capsule, Rfl: 0    fluticasone (FLONASE) 50 MCG/ACT nasal spray, 2 sprays by Each Nare route Daily. For allergies, Disp: 16 g, Rfl: 1    hydroCHLOROthiazide 50 MG tablet, Take 1 tablet by mouth Daily. As needed for swelling, Disp: 30 tablet, Rfl: 1    Multiple Vitamins-Minerals (Multivitamin Gummies Adult) chewable tablet, Chew 2 each Daily., Disp: 180 tablet, Rfl: 3    omeprazole (priLOSEC) 40 MG capsule, Take 1 capsule by mouth Daily. For acid reflux, Disp: 30 capsule, Rfl: 1    oxyCODONE-acetaminophen (PERCOCET)  MG per tablet, Take 1 tablet by mouth 4 (Four) Times a Day., Disp: , Rfl:     polyethylene glycol (MIRALAX) 17 g packet, Take 17 g by mouth Daily. For constipation, Disp: 90 each, Rfl: 0    propranolol (INDERAL) 10 MG tablet, Take 1 tablet by mouth 4 (Four) Times a Day As Needed (anxiety)., Disp: 120 tablet, Rfl: 1    topiramate (TOPAMAX) 50 MG tablet, Take 1 tablet by mouth Every Night., Disp: 90 tablet, Rfl: 0    traZODone (DESYREL) 150 MG tablet, Take 1 tablet by mouth every night at bedtime., Disp: 90 tablet, Rfl: 0    VITAMIN C, CALCIUM ASCORBATE, PO, Take  by mouth., Disp: , Rfl:     zinc sulfate (ZINCATE) 220 (50 Zn) MG capsule, Take 1 capsule by mouth Daily., Disp: , Rfl:     Allergies:   Allergies   Allergen Reactions    Levofloxacin Unknown - Low Severity     Other reaction(s): joints hurt post taking    Nsaids Other (See Comments) and Myalgia     Hx ulcers    Other reaction(s): Other (See Comments)    Pt states upset stomach    contraindicated due to stomach ulcersPt states upset stomach    Adhesive Tape Rash     Surgical tape - bruising and rash  Surgical tape - bruising and rash         Objective     MENTAL STATUS EXAM   General Appearance:  Cleanly groomed and dressed  Eye Contact:  Good eye contact  Attitude:   Cooperative  Motor Activity:  Normal gait, posture  Muscle Strength:  Normal  Speech:  Normal rate, tone, volume  Language:  Spontaneous  Mood and affect:  Normal, pleasant and frustrated  Thought Process:  Logical and goal-directed  Associations/ Thought Content:  No delusions  Hallucinations:  None  Suicidal Ideations:  Not present  Homicidal Ideation:  Not present  Sensorium:  Alert and clear  Orientation:  Person, place, time and situation  Immediate Recall, Recent, and Remote Memory:  Intact  Attention Span/ Concentration:  Good  Fund of Knowledge:  Appropriate for age and educational level  Intellectual Functioning:  Above average  Insight:  Good  Judgement:  Good  Reliability:  Good  Impulse Control:  Good     SUICIDE RISK ASSESSMENT/CSSRS:  1. Does patient have thoughts of suicide? no  2. Does patient have intent for suicide? no  3. Does patient have a current plan for suicide? no    Assessment / Plan      Visit Diagnosis/Orders Placed This Visit:    ICD-10-CM ICD-9-CM   1. Moderate depressive disorder  F32.A 311   2. Attention deficit hyperactivity disorder (ADHD), combined type  F90.2 314.01   3. Post traumatic stress disorder (PTSD)  F43.10 309.81      PLAN:  Safety: No acute safety concerns  Risk Assessment: Risk of self-harm acutely is low. Risk of self-harm chronically is also low, but could be further elevated in the event of treatment noncompliance and/or AODA.    Treatment Plan/ Short and Long Term Goals: Continue supportive psychotherapy efforts and medications as indicated. Treatment and medication options discussed during today's visit. Patient ackowledged and verbally consented to continue with current treatment plan and was educated on the importance of compliance with treatment and follow-up appointments. Patient seems reasonably able to adhere to treatment plan.      Assisted Patient in processing above session content; acknowledged and normalized patient’s thoughts, feelings, and concerns.   Rationalized patient thought process regarding frustration with her life. Coping skills      Allowed Patient to freely discuss issues  without interruption or judgement with unconditional positive regard, active listening skills, and empathy. Therapist provided a safe, confidential environment to facilitate the development of a positive therapeutic relationship and encouraged open, honest communication. Assisted Patient in identifying risk factors which would indicate the need for higher level of care including thoughts to harm self or others and/or self-harming behavior and encouraged Patient to contact this office, call 911, or present to the nearest emergency room should any of these events occur. Discussed crisis intervention services and means to access. Patient adamantly and convincingly denies current suicidal or homicidal ideation or perceptual disturbance. Assisted Patient in processing session content; acknowledged and normalized Patient’s thoughts, feelings, and concerns by utilizing a person-centered approach in efforts to build appropriate rapport and a positive therapeutic relationship with open and honest communication.     Follow Up:   Return in about 1 week (around 6/11/2025) for Psychoterapy.    Norman Hassan LCSW, [facility]

## 2025-06-07 DIAGNOSIS — M80.80XS OTHER OSTEOPOROSIS WITH CURRENT PATHOLOGICAL FRACTURE, SEQUELA: ICD-10-CM

## 2025-06-07 NOTE — TELEPHONE ENCOUNTER
Requested Prescriptions:   Requested Prescriptions     Pending Prescriptions Disp Refills    alendronate (Fosamax) 70 MG tablet 12 tablet 4     Sig: Take 1 tablet by mouth Every 7 (Seven) Days. For osteopenia        Pharmacy where request should be sent: [id number] - [place], [place] [address]  [address] [facility] - [phone]  - [phone]     Last office visit with prescribing clinician: 5/14/2024   Last telemedicine visit with prescribing clinician: Visit date not found   Next office visit with prescribing clinician: Visit date not found     Additional details provided by patient: REQUEST THROUGH ONBASE

## 2025-06-10 RX ORDER — ALENDRONATE SODIUM 70 MG/1
70 TABLET ORAL
Qty: 12 TABLET | Refills: 0 | Status: SHIPPED | OUTPATIENT
Start: 2025-06-10

## 2025-06-12 DIAGNOSIS — F90.2 ATTENTION DEFICIT HYPERACTIVITY DISORDER (ADHD), COMBINED TYPE: ICD-10-CM

## 2025-06-12 RX ORDER — DEXTROAMPHETAMINE SACCHARATE, AMPHETAMINE ASPARTATE MONOHYDRATE, DEXTROAMPHETAMINE SULFATE AND AMPHETAMINE SULFATE 6.25; 6.25; 6.25; 6.25 MG/1; MG/1; MG/1; MG/1
25 CAPSULE, EXTENDED RELEASE ORAL EVERY MORNING
Qty: 30 CAPSULE | Refills: 0 | Status: SHIPPED | OUTPATIENT
Start: 2025-06-12

## 2025-06-26 ENCOUNTER — INFUSION (OUTPATIENT)
Dept: ONCOLOGY | Facility: HOSPITAL | Age: 50
End: 2025-06-26
Payer: COMMERCIAL

## 2025-06-26 VITALS
DIASTOLIC BLOOD PRESSURE: 98 MMHG | WEIGHT: 229.6 LBS | TEMPERATURE: 96.8 F | RESPIRATION RATE: 18 BRPM | BODY MASS INDEX: 35.96 KG/M2 | HEART RATE: 80 BPM | SYSTOLIC BLOOD PRESSURE: 148 MMHG

## 2025-06-26 DIAGNOSIS — E53.8 B12 DEFICIENCY: Primary | ICD-10-CM

## 2025-06-26 PROCEDURE — 96372 THER/PROPH/DIAG INJ SC/IM: CPT

## 2025-06-26 PROCEDURE — 25010000002 CYANOCOBALAMIN PER 1000 MCG: Performed by: INTERNAL MEDICINE

## 2025-06-26 RX ORDER — CYANOCOBALAMIN 1000 UG/ML
1000 INJECTION, SOLUTION INTRAMUSCULAR; SUBCUTANEOUS ONCE
Status: COMPLETED | OUTPATIENT
Start: 2025-06-26 | End: 2025-06-26

## 2025-06-26 RX ADMIN — CYANOCOBALAMIN 1000 MCG: 1000 INJECTION, SOLUTION INTRAMUSCULAR at 14:22

## 2025-07-10 DIAGNOSIS — F90.2 ATTENTION DEFICIT HYPERACTIVITY DISORDER (ADHD), COMBINED TYPE: ICD-10-CM

## 2025-07-10 RX ORDER — DEXTROAMPHETAMINE SACCHARATE, AMPHETAMINE ASPARTATE MONOHYDRATE, DEXTROAMPHETAMINE SULFATE AND AMPHETAMINE SULFATE 6.25; 6.25; 6.25; 6.25 MG/1; MG/1; MG/1; MG/1
25 CAPSULE, EXTENDED RELEASE ORAL EVERY MORNING
Qty: 30 CAPSULE | Refills: 0 | Status: SHIPPED | OUTPATIENT
Start: 2025-07-10

## 2025-07-16 DIAGNOSIS — F41.1 GENERALIZED ANXIETY DISORDER: ICD-10-CM

## 2025-07-16 RX ORDER — PROPRANOLOL HYDROCHLORIDE 10 MG/1
10 TABLET ORAL
Qty: 120 TABLET | Refills: 1 | OUTPATIENT
Start: 2025-07-16

## 2025-07-23 ENCOUNTER — INFUSION (OUTPATIENT)
Dept: ONCOLOGY | Facility: HOSPITAL | Age: 50
End: 2025-07-23
Payer: COMMERCIAL

## 2025-07-23 DIAGNOSIS — E53.8 B12 DEFICIENCY: Primary | ICD-10-CM

## 2025-07-23 DIAGNOSIS — E66.9 OBESITY (BMI 30-39.9): ICD-10-CM

## 2025-07-23 DIAGNOSIS — F31.9 BIPOLAR 1 DISORDER: ICD-10-CM

## 2025-07-23 DIAGNOSIS — F41.1 GENERALIZED ANXIETY DISORDER: ICD-10-CM

## 2025-07-23 PROCEDURE — 96372 THER/PROPH/DIAG INJ SC/IM: CPT

## 2025-07-23 PROCEDURE — 25010000002 CYANOCOBALAMIN PER 1000 MCG: Performed by: INTERNAL MEDICINE

## 2025-07-23 RX ORDER — CYANOCOBALAMIN 1000 UG/ML
1000 INJECTION, SOLUTION INTRAMUSCULAR; SUBCUTANEOUS ONCE
Status: COMPLETED | OUTPATIENT
Start: 2025-07-23 | End: 2025-07-23

## 2025-07-23 RX ADMIN — CYANOCOBALAMIN 1000 MCG: 1000 INJECTION, SOLUTION INTRAMUSCULAR at 13:27

## 2025-07-24 ENCOUNTER — TELEPHONE (OUTPATIENT)
Dept: BEHAVIORAL HEALTH | Facility: CLINIC | Age: 50
End: 2025-07-24

## 2025-07-24 DIAGNOSIS — G47.20 CIRCADIAN RHYTHM SLEEP DISORDER, UNSPECIFIED TYPE: ICD-10-CM

## 2025-07-24 DIAGNOSIS — E66.9 OBESITY (BMI 30-39.9): ICD-10-CM

## 2025-07-24 DIAGNOSIS — F31.9 BIPOLAR 1 DISORDER: ICD-10-CM

## 2025-07-24 DIAGNOSIS — F41.1 GENERALIZED ANXIETY DISORDER: ICD-10-CM

## 2025-07-24 DIAGNOSIS — F43.10 POST TRAUMATIC STRESS DISORDER (PTSD): ICD-10-CM

## 2025-07-24 RX ORDER — TRAZODONE HYDROCHLORIDE 150 MG/1
150 TABLET ORAL
Qty: 90 TABLET | Refills: 0 | Status: SHIPPED | OUTPATIENT
Start: 2025-07-24

## 2025-07-24 RX ORDER — DULOXETIN HYDROCHLORIDE 60 MG/1
120 CAPSULE, DELAYED RELEASE ORAL DAILY
Qty: 180 CAPSULE | Refills: 0 | Status: SHIPPED | OUTPATIENT
Start: 2025-07-24

## 2025-07-24 RX ORDER — DULOXETIN HYDROCHLORIDE 60 MG/1
120 CAPSULE, DELAYED RELEASE ORAL DAILY
Qty: 180 CAPSULE | Refills: 0 | OUTPATIENT
Start: 2025-07-24

## 2025-07-24 RX ORDER — TOPIRAMATE 50 MG/1
50 TABLET, FILM COATED ORAL NIGHTLY
Qty: 90 TABLET | Refills: 0 | OUTPATIENT
Start: 2025-07-24

## 2025-07-24 RX ORDER — AMITRIPTYLINE HYDROCHLORIDE 100 MG/1
100 TABLET ORAL
Qty: 90 TABLET | Refills: 0 | Status: SHIPPED | OUTPATIENT
Start: 2025-07-24

## 2025-07-24 RX ORDER — TOPIRAMATE 50 MG/1
50 TABLET, FILM COATED ORAL NIGHTLY
Qty: 90 TABLET | Refills: 0 | Status: SHIPPED | OUTPATIENT
Start: 2025-07-24

## 2025-08-07 DIAGNOSIS — F43.10 POST TRAUMATIC STRESS DISORDER (PTSD): ICD-10-CM

## 2025-08-07 DIAGNOSIS — G47.20 CIRCADIAN RHYTHM SLEEP DISORDER, UNSPECIFIED TYPE: ICD-10-CM

## 2025-08-07 DIAGNOSIS — F41.1 GENERALIZED ANXIETY DISORDER: ICD-10-CM

## 2025-08-07 RX ORDER — AMITRIPTYLINE HYDROCHLORIDE 100 MG/1
100 TABLET ORAL
Qty: 90 TABLET | Refills: 0 | OUTPATIENT
Start: 2025-08-07

## 2025-08-07 RX ORDER — TRAZODONE HYDROCHLORIDE 150 MG/1
150 TABLET ORAL
Qty: 90 TABLET | Refills: 0 | OUTPATIENT
Start: 2025-08-07

## 2025-08-10 DIAGNOSIS — F41.1 GENERALIZED ANXIETY DISORDER: ICD-10-CM

## 2025-08-10 DIAGNOSIS — G47.20 CIRCADIAN RHYTHM SLEEP DISORDER, UNSPECIFIED TYPE: ICD-10-CM

## 2025-08-10 DIAGNOSIS — F43.10 POST TRAUMATIC STRESS DISORDER (PTSD): ICD-10-CM

## 2025-08-11 RX ORDER — TRAZODONE HYDROCHLORIDE 150 MG/1
150 TABLET ORAL
Qty: 90 TABLET | Refills: 0 | OUTPATIENT
Start: 2025-08-11

## 2025-08-18 ENCOUNTER — OFFICE VISIT (OUTPATIENT)
Dept: BEHAVIORAL HEALTH | Facility: CLINIC | Age: 50
End: 2025-08-18
Payer: COMMERCIAL

## 2025-08-18 VITALS
DIASTOLIC BLOOD PRESSURE: 74 MMHG | OXYGEN SATURATION: 98 % | HEIGHT: 67 IN | HEART RATE: 72 BPM | BODY MASS INDEX: 35.16 KG/M2 | WEIGHT: 224 LBS | SYSTOLIC BLOOD PRESSURE: 118 MMHG

## 2025-08-18 DIAGNOSIS — J30.2 SEASONAL ALLERGIES: ICD-10-CM

## 2025-08-18 DIAGNOSIS — F43.10 POST TRAUMATIC STRESS DISORDER (PTSD): ICD-10-CM

## 2025-08-18 DIAGNOSIS — G47.20 CIRCADIAN RHYTHM SLEEP DISORDER, UNSPECIFIED TYPE: ICD-10-CM

## 2025-08-18 DIAGNOSIS — F31.9 BIPOLAR 1 DISORDER: ICD-10-CM

## 2025-08-18 DIAGNOSIS — F41.1 GENERALIZED ANXIETY DISORDER: Primary | ICD-10-CM

## 2025-08-18 DIAGNOSIS — E66.9 OBESITY (BMI 30-39.9): ICD-10-CM

## 2025-08-18 DIAGNOSIS — F90.2 ATTENTION DEFICIT HYPERACTIVITY DISORDER (ADHD), COMBINED TYPE: ICD-10-CM

## 2025-08-18 PROCEDURE — 99214 OFFICE O/P EST MOD 30 MIN: CPT | Performed by: NURSE PRACTITIONER

## 2025-08-18 PROCEDURE — 96127 BRIEF EMOTIONAL/BEHAV ASSMT: CPT | Performed by: NURSE PRACTITIONER

## 2025-08-18 PROCEDURE — 1160F RVW MEDS BY RX/DR IN RCRD: CPT | Performed by: NURSE PRACTITIONER

## 2025-08-18 PROCEDURE — 1159F MED LIST DOCD IN RCRD: CPT | Performed by: NURSE PRACTITIONER

## 2025-08-18 RX ORDER — CETIRIZINE HYDROCHLORIDE 10 MG/1
10 TABLET ORAL DAILY
Qty: 90 TABLET | Refills: 3 | OUTPATIENT
Start: 2025-08-18

## 2025-08-18 RX ORDER — DEXTROAMPHETAMINE SACCHARATE, AMPHETAMINE ASPARTATE MONOHYDRATE, DEXTROAMPHETAMINE SULFATE AND AMPHETAMINE SULFATE 7.5; 7.5; 7.5; 7.5 MG/1; MG/1; MG/1; MG/1
30 CAPSULE, EXTENDED RELEASE ORAL EVERY MORNING
Qty: 30 CAPSULE | Refills: 0 | Status: SHIPPED | OUTPATIENT
Start: 2025-08-18

## 2025-08-21 ENCOUNTER — INFUSION (OUTPATIENT)
Age: 50
End: 2025-08-21
Payer: COMMERCIAL

## 2025-08-21 VITALS
DIASTOLIC BLOOD PRESSURE: 75 MMHG | WEIGHT: 222.6 LBS | BODY MASS INDEX: 34.86 KG/M2 | TEMPERATURE: 97 F | RESPIRATION RATE: 18 BRPM | SYSTOLIC BLOOD PRESSURE: 128 MMHG | HEART RATE: 79 BPM

## 2025-08-21 DIAGNOSIS — E53.8 B12 DEFICIENCY: Primary | ICD-10-CM

## 2025-08-21 PROCEDURE — 96372 THER/PROPH/DIAG INJ SC/IM: CPT

## 2025-08-21 PROCEDURE — 25010000002 CYANOCOBALAMIN PER 1000 MCG: Performed by: INTERNAL MEDICINE

## 2025-08-21 RX ORDER — CYANOCOBALAMIN 1000 UG/ML
1000 INJECTION, SOLUTION INTRAMUSCULAR; SUBCUTANEOUS ONCE
Status: COMPLETED | OUTPATIENT
Start: 2025-08-21 | End: 2025-08-21

## 2025-08-21 RX ADMIN — CYANOCOBALAMIN 1000 MCG: 1000 INJECTION, SOLUTION INTRAMUSCULAR; SUBCUTANEOUS at 14:22
